# Patient Record
Sex: MALE | Race: WHITE | NOT HISPANIC OR LATINO
[De-identification: names, ages, dates, MRNs, and addresses within clinical notes are randomized per-mention and may not be internally consistent; named-entity substitution may affect disease eponyms.]

---

## 2018-12-10 PROBLEM — Z00.00 ENCOUNTER FOR PREVENTIVE HEALTH EXAMINATION: Status: ACTIVE | Noted: 2018-12-10

## 2018-12-11 ENCOUNTER — APPOINTMENT (OUTPATIENT)
Dept: NEUROSURGERY | Facility: CLINIC | Age: 73
End: 2018-12-11
Payer: MEDICARE

## 2018-12-11 DIAGNOSIS — Z78.9 OTHER SPECIFIED HEALTH STATUS: ICD-10-CM

## 2018-12-11 PROCEDURE — 99205 OFFICE O/P NEW HI 60 MIN: CPT

## 2018-12-11 RX ORDER — SIMVASTATIN 80 MG/1
TABLET, FILM COATED ORAL
Refills: 0 | Status: ACTIVE | COMMUNITY

## 2018-12-11 RX ORDER — CLOPIDOGREL 75 MG/1
TABLET, FILM COATED ORAL
Refills: 0 | Status: ACTIVE | COMMUNITY

## 2018-12-11 RX ORDER — METOPROLOL SUCCINATE 25 MG/1
25 TABLET, EXTENDED RELEASE ORAL
Refills: 0 | Status: ACTIVE | COMMUNITY

## 2018-12-21 ENCOUNTER — OUTPATIENT (OUTPATIENT)
Dept: OUTPATIENT SERVICES | Facility: HOSPITAL | Age: 73
LOS: 1 days | End: 2018-12-21
Payer: MEDICARE

## 2018-12-21 VITALS
WEIGHT: 154.98 LBS | TEMPERATURE: 98 F | OXYGEN SATURATION: 99 % | HEART RATE: 63 BPM | SYSTOLIC BLOOD PRESSURE: 118 MMHG | RESPIRATION RATE: 14 BRPM | DIASTOLIC BLOOD PRESSURE: 71 MMHG | HEIGHT: 71 IN

## 2018-12-21 DIAGNOSIS — Z98.890 OTHER SPECIFIED POSTPROCEDURAL STATES: Chronic | ICD-10-CM

## 2018-12-21 DIAGNOSIS — I67.1 CEREBRAL ANEURYSM, NONRUPTURED: ICD-10-CM

## 2018-12-21 DIAGNOSIS — Z01.818 ENCOUNTER FOR OTHER PREPROCEDURAL EXAMINATION: ICD-10-CM

## 2018-12-21 DIAGNOSIS — Z95.5 PRESENCE OF CORONARY ANGIOPLASTY IMPLANT AND GRAFT: Chronic | ICD-10-CM

## 2018-12-21 LAB
ANION GAP SERPL CALC-SCNC: 12 MMOL/L — SIGNIFICANT CHANGE UP (ref 5–17)
BLD GP AB SCN SERPL QL: NEGATIVE — SIGNIFICANT CHANGE UP
BUN SERPL-MCNC: 14 MG/DL — SIGNIFICANT CHANGE UP (ref 7–23)
CALCIUM SERPL-MCNC: 9.2 MG/DL — SIGNIFICANT CHANGE UP (ref 8.4–10.5)
CHLORIDE SERPL-SCNC: 102 MMOL/L — SIGNIFICANT CHANGE UP (ref 96–108)
CO2 SERPL-SCNC: 27 MMOL/L — SIGNIFICANT CHANGE UP (ref 22–31)
CREAT SERPL-MCNC: 0.62 MG/DL — SIGNIFICANT CHANGE UP (ref 0.5–1.3)
GLUCOSE SERPL-MCNC: 77 MG/DL — SIGNIFICANT CHANGE UP (ref 70–99)
HCT VFR BLD CALC: 44.3 % — SIGNIFICANT CHANGE UP (ref 39–50)
HGB BLD-MCNC: 14.7 G/DL — SIGNIFICANT CHANGE UP (ref 13–17)
MCHC RBC-ENTMCNC: 28.1 PG — SIGNIFICANT CHANGE UP (ref 27–34)
MCHC RBC-ENTMCNC: 33.2 GM/DL — SIGNIFICANT CHANGE UP (ref 32–36)
MCV RBC AUTO: 84.5 FL — SIGNIFICANT CHANGE UP (ref 80–100)
PLATELET # BLD AUTO: 188 K/UL — SIGNIFICANT CHANGE UP (ref 150–400)
POTASSIUM SERPL-MCNC: 4.4 MMOL/L — SIGNIFICANT CHANGE UP (ref 3.5–5.3)
POTASSIUM SERPL-SCNC: 4.4 MMOL/L — SIGNIFICANT CHANGE UP (ref 3.5–5.3)
RBC # BLD: 5.24 M/UL — SIGNIFICANT CHANGE UP (ref 4.2–5.8)
RBC # FLD: 13.7 % — SIGNIFICANT CHANGE UP (ref 10.3–14.5)
RH IG SCN BLD-IMP: POSITIVE — SIGNIFICANT CHANGE UP
SODIUM SERPL-SCNC: 141 MMOL/L — SIGNIFICANT CHANGE UP (ref 135–145)
WBC # BLD: 5.06 K/UL — SIGNIFICANT CHANGE UP (ref 3.8–10.5)
WBC # FLD AUTO: 5.06 K/UL — SIGNIFICANT CHANGE UP (ref 3.8–10.5)

## 2018-12-21 PROCEDURE — 85027 COMPLETE CBC AUTOMATED: CPT

## 2018-12-21 PROCEDURE — 80048 BASIC METABOLIC PNL TOTAL CA: CPT

## 2018-12-21 PROCEDURE — 86901 BLOOD TYPING SEROLOGIC RH(D): CPT

## 2018-12-21 PROCEDURE — 86850 RBC ANTIBODY SCREEN: CPT

## 2018-12-21 PROCEDURE — 86900 BLOOD TYPING SEROLOGIC ABO: CPT

## 2018-12-21 PROCEDURE — G0463: CPT

## 2018-12-21 RX ORDER — METOPROLOL TARTRATE 50 MG
1 TABLET ORAL
Qty: 0 | Refills: 0 | COMMUNITY

## 2018-12-21 NOTE — H&P PST ADULT - HISTORY OF PRESENT ILLNESS
72 yo male. h/o CAD (s/p ENRIQUE x1 in LAD 2006), prostate cancinoma (s/p XRT 2009). approximately 3 weeks ago, c/o temporary memory loss post coitus, work up revealed 2 unruptured cerebral aneurysms: 3mm aneurysm clinoid left ICA and a 4mm aneurysm surpaclinoid right ICA. presents to PST scheduled for cerebral angiogram on 12/31.

## 2018-12-31 ENCOUNTER — APPOINTMENT (OUTPATIENT)
Dept: NEUROSURGERY | Facility: HOSPITAL | Age: 73
End: 2018-12-31
Payer: MEDICARE

## 2018-12-31 ENCOUNTER — OUTPATIENT (OUTPATIENT)
Dept: OUTPATIENT SERVICES | Facility: HOSPITAL | Age: 73
LOS: 1 days | End: 2018-12-31
Payer: MEDICARE

## 2018-12-31 DIAGNOSIS — I67.1 CEREBRAL ANEURYSM, NONRUPTURED: ICD-10-CM

## 2018-12-31 DIAGNOSIS — Z01.818 ENCOUNTER FOR OTHER PREPROCEDURAL EXAMINATION: ICD-10-CM

## 2018-12-31 DIAGNOSIS — Z98.890 OTHER SPECIFIED POSTPROCEDURAL STATES: Chronic | ICD-10-CM

## 2018-12-31 DIAGNOSIS — Z95.5 PRESENCE OF CORONARY ANGIOPLASTY IMPLANT AND GRAFT: Chronic | ICD-10-CM

## 2018-12-31 LAB — PA ADP PRP-ACNC: 89 PRU — LOW (ref 194–417)

## 2018-12-31 PROCEDURE — 36224 PLACE CATH CAROTD ART: CPT | Mod: 50

## 2018-12-31 PROCEDURE — C1894: CPT

## 2018-12-31 PROCEDURE — 36227 PLACE CATH XTRNL CAROTID: CPT

## 2018-12-31 PROCEDURE — 36226 PLACE CATH VERTEBRAL ART: CPT | Mod: 50

## 2018-12-31 PROCEDURE — C1769: CPT

## 2018-12-31 PROCEDURE — 85576 BLOOD PLATELET AGGREGATION: CPT

## 2018-12-31 PROCEDURE — 36227 PLACE CATH XTRNL CAROTID: CPT | Mod: 50

## 2018-12-31 PROCEDURE — C1887: CPT

## 2018-12-31 PROCEDURE — 36226 PLACE CATH VERTEBRAL ART: CPT

## 2018-12-31 PROCEDURE — 76377 3D RENDER W/INTRP POSTPROCES: CPT | Mod: 26

## 2018-12-31 PROCEDURE — 36224 PLACE CATH CAROTD ART: CPT

## 2018-12-31 RX ORDER — SODIUM CHLORIDE 9 MG/ML
1000 INJECTION INTRAMUSCULAR; INTRAVENOUS; SUBCUTANEOUS
Qty: 0 | Refills: 0 | Status: DISCONTINUED | OUTPATIENT
Start: 2018-12-31 | End: 2019-01-15

## 2018-12-31 NOTE — CHART NOTE - NSCHARTNOTEFT_GEN_A_CORE
Interventional Neuro Radiology  Pre-Procedure Note PA-CELI    This is a 73y ____ hand dominant Male      HPI:      Allergies: No Known Allergies  Zithromax (Other)      PAST MEDICAL & SURGICAL HISTORY:  Dyslipidemia  Prostate cancer: s/p XRT 2009  CAD (coronary artery disease)  Palpitation: treated with metoprolol  S/P drug eluting coronary stent placement: LAD 2006  H/O hernia repair: x3  1980, 1983, 2002 with mesh  left inguinal      Social History:     FAMILY HISTORY:      Current Medications:     Complete Blood Count     WBC Count: 5.06 K/uL    Hemoglobin: 14.7 g/dL    Hematocrit: 44.3 %    Platelet Count - Automated: 188 K/uL    Basic Metabolic Panel (12.21.18 @ 11:13)    Sodium, Serum: 141 mmol/L    Potassium, Serum: 4.4 mmol/L    Chloride, Serum: 102 mmol/L    Carbon Dioxide, Serum: 27 mmol/L    Anion Gap, Serum: 12 mmol/L    Blood Urea Nitrogen, Serum: 14 mg/dL    Creatinine, Serum: 0.62 mg/dL    Glucose, Serum: 77 mg/dL    Calcium, Total Serum: 9.2 mg/dL        Blood Bank:     Assessment/Plan:   This is a 73y  year old right  hand dominant Male  presents with   Patient presents to neuro-IR for selective cerebral angiography.   Procedure, goals, risks, benefits and alternatives  were discussed with patient and patient's family.  All questions were answered.  Risks include but are not limited to stroke, vessel injury, hemorrhage, and or right  groin hematoma.  Patient demonstrates understanding  of all risks involved with this procedure and wishes to continue.   Appropriate  content was obtained from patient and consent is in the patient's chart.

## 2018-12-31 NOTE — CHART NOTE - NSCHARTNOTEFT_GEN_A_CORE
Interventional Neuro- Radiology   Procedure Note TASHA    Procedure: Selective Cerebral Angiography   Pre- Procedure Diagnosis:  Post- Procedure Diagnosis:    : Dr Yves Martinez    Physician Assistant: Maria Isabel Delgado PA-C    Nurse:    Anesthesia: (MAC)   (general anesthesia)    Sheath:    I/Os:  Estimated blood loss less than 10cc  IV fluids:     cc     Urine output     cc        Contrast Omnipaque 240      cc           Vitals: BP         HR      Spo2    %       Preliminary Report:    Using a 5 Malay long sheath to the right groin under MAC sedation via   left vertebral artery,  left common carotid artery, left external carotid artery, right vertebral artery,  right common carotid artery, right external carotid artery  a selective cerebral angiography was performed and  demonstrates ________. ( Official note to follow).  Patient tolerated procedure well, hemodynamically stable, no change in neurological status compared to baseline.  Results discussed with neurosurgery, patient and patient's  family.  Groin sheath was removed,  manual compression held to hemostasis  for  21 minutes, no active bleeding, no hematoma,  quick clot and safeguard balloon dressing applied at _____h. Interventional Neuro- Radiology   Procedure Note PA-C    Procedure: Selective Cerebral Angiography   Pre- Procedure Diagnosis: cerebral aneurysm   Post- Procedure Diagnosis: bilateral ICA aneurysms    : Dr Yves Martinez    Physician Assistant: Maria Isabel Delgado PA-C    Nurse:  Radiologic Tech:  Anesthesiologist:     Dr Juanjo Ambrocio  Sheath:    I/Os: EBL less than 10cc  IV fluids:     cc     Urine output     cc        Contrast Omnipaque 240      cc           Vitals: BP         HR      Spo2    %       Preliminary Report:    Using a 5 Nicaraguan long sheath to the right groin under MAC sedation via left vertebral artery, left internal carotid artery, left external carotid artery, right vertebral artery, right internal carotid artery, right external carotid artery a selective cerebral angiography was performed and demonstrated bilateral ICA aneurysms. Official note to follow.   Patient tolerated procedure well, hemodynamically stable, no change in neurological status compared to baseline. Results discussed with patient and patient's family.  Right groin sheath was removed, manual compression held to hemostasis for 20 minutes, no active bleeding, no hematoma, quick clot and safeguard balloon dressing applied at

## 2019-01-03 PROBLEM — C61 MALIGNANT NEOPLASM OF PROSTATE: Chronic | Status: ACTIVE | Noted: 2018-12-21

## 2019-01-16 ENCOUNTER — APPOINTMENT (OUTPATIENT)
Dept: NEUROSURGERY | Facility: CLINIC | Age: 74
End: 2019-01-16
Payer: MEDICARE

## 2019-01-16 PROCEDURE — 99215 OFFICE O/P EST HI 40 MIN: CPT

## 2019-01-18 NOTE — PHYSICAL EXAM
[FreeTextEntry1] : Awake, alert, and oriented x 3. VSS. In no apparent distress.   Short and long term memory intact.  Speech is clear and appropriate.  Affect is normal.  Voice is strong.  Respirations easy and even.  Normal skin color and pigmentation.  The sclera and conjunctiva normal.  Ears, nose, and neck normal in appearance.  EOMI, no nystagmus, facial sensation intact symmetrically, face symmetrical, hearing intact bilaterally, tongue and palate midline, head turning and shoulder shrug symmetric and no tongue deviation with protrusion.  No pronator drift.   No past-pointing, no tremors noted, no dysdiadochokinesia, and finger to nose dysmetria was not present.  Romberg negative.  \par Ambidextrous , more dominant LEFT\par \par Rises from a seated position in a comfortable fashion.\par \par Gait is well coordinated and stable without the use of an assistive device.   Able to perform tandem walk without loss of balance.   Motor strength in the upper extremities 5/5 in the biceps, triceps, and hand .  Motor strength in the lower extremities is 5/5 in the iliopsoas, quadriceps, and hamstrings.  \par \par

## 2019-01-18 NOTE — HISTORY OF PRESENT ILLNESS
[FreeTextEntry1] : 74 yo right handed male presents to the office for a follow up post cerebral angiogram by Dr. Martinez recently and demonstrated right proximal ICA fusiform dilatation of whole vessel diseased and left ICA aneurysm which will be treated 4-5 weeks later. \par On 12/4/18, MRA shows 3 mm aneurysm clinoid left ICA and 4 mm aneurysm supraclinoid right ICA. MRI unremarkable. \par \par PMH of Prostate cancer treated with radiation in 2009, CAD with stents on Plavix and ASA from 2006. \par PSH Hernia surgery\par Non smoker and denies family history of aneurysms.\par He had an EEG by Dr. Hoang in December 2018. \par [id number] 89 on 12/31/18\par All risks and benefits for treatment with Pipeline stent is being discussed by Dr. Martinez, Dr. Vital and pt. \par \par Plan: Treat aneurysm with Pipleline Stent by Dr. Yves Martinez- PT ALREADY ON PLAVIX AND ASA \par \par \par

## 2019-02-28 ENCOUNTER — OUTPATIENT (OUTPATIENT)
Dept: OUTPATIENT SERVICES | Facility: HOSPITAL | Age: 74
LOS: 1 days | End: 2019-02-28
Payer: MEDICARE

## 2019-02-28 VITALS
SYSTOLIC BLOOD PRESSURE: 106 MMHG | WEIGHT: 162.04 LBS | DIASTOLIC BLOOD PRESSURE: 71 MMHG | HEIGHT: 71 IN | HEART RATE: 58 BPM | RESPIRATION RATE: 16 BRPM | TEMPERATURE: 98 F | OXYGEN SATURATION: 96 %

## 2019-02-28 DIAGNOSIS — I67.1 CEREBRAL ANEURYSM, NONRUPTURED: ICD-10-CM

## 2019-02-28 DIAGNOSIS — Z01.818 ENCOUNTER FOR OTHER PREPROCEDURAL EXAMINATION: ICD-10-CM

## 2019-02-28 DIAGNOSIS — Z98.890 OTHER SPECIFIED POSTPROCEDURAL STATES: Chronic | ICD-10-CM

## 2019-02-28 DIAGNOSIS — Z95.5 PRESENCE OF CORONARY ANGIOPLASTY IMPLANT AND GRAFT: Chronic | ICD-10-CM

## 2019-02-28 LAB
ANION GAP SERPL CALC-SCNC: 14 MMOL/L — SIGNIFICANT CHANGE UP (ref 5–17)
BLD GP AB SCN SERPL QL: NEGATIVE — SIGNIFICANT CHANGE UP
BUN SERPL-MCNC: 18 MG/DL — SIGNIFICANT CHANGE UP (ref 7–23)
CALCIUM SERPL-MCNC: 9.7 MG/DL — SIGNIFICANT CHANGE UP (ref 8.4–10.5)
CHLORIDE SERPL-SCNC: 101 MMOL/L — SIGNIFICANT CHANGE UP (ref 96–108)
CO2 SERPL-SCNC: 25 MMOL/L — SIGNIFICANT CHANGE UP (ref 22–31)
CREAT SERPL-MCNC: 0.59 MG/DL — SIGNIFICANT CHANGE UP (ref 0.5–1.3)
GLUCOSE SERPL-MCNC: 126 MG/DL — HIGH (ref 70–99)
HCT VFR BLD CALC: 46.3 % — SIGNIFICANT CHANGE UP (ref 39–50)
HGB BLD-MCNC: 15.1 G/DL — SIGNIFICANT CHANGE UP (ref 13–17)
MCHC RBC-ENTMCNC: 27.9 PG — SIGNIFICANT CHANGE UP (ref 27–34)
MCHC RBC-ENTMCNC: 32.6 GM/DL — SIGNIFICANT CHANGE UP (ref 32–36)
MCV RBC AUTO: 85.6 FL — SIGNIFICANT CHANGE UP (ref 80–100)
PLATELET # BLD AUTO: 205 K/UL — SIGNIFICANT CHANGE UP (ref 150–400)
POTASSIUM SERPL-MCNC: 4 MMOL/L — SIGNIFICANT CHANGE UP (ref 3.5–5.3)
POTASSIUM SERPL-SCNC: 4 MMOL/L — SIGNIFICANT CHANGE UP (ref 3.5–5.3)
RBC # BLD: 5.41 M/UL — SIGNIFICANT CHANGE UP (ref 4.2–5.8)
RBC # FLD: 13.1 % — SIGNIFICANT CHANGE UP (ref 10.3–14.5)
RH IG SCN BLD-IMP: POSITIVE — SIGNIFICANT CHANGE UP
SODIUM SERPL-SCNC: 140 MMOL/L — SIGNIFICANT CHANGE UP (ref 135–145)
WBC # BLD: 5.63 K/UL — SIGNIFICANT CHANGE UP (ref 3.8–10.5)
WBC # FLD AUTO: 5.63 K/UL — SIGNIFICANT CHANGE UP (ref 3.8–10.5)

## 2019-02-28 NOTE — H&P PST ADULT - CARDIOVASCULAR
Regular rate & rhythm, normal S1, S2; no murmurs, gallops or rubs; no S3, S4 negative details… detailed exam

## 2019-02-28 NOTE — H&P PST ADULT - PROBLEM SELECTOR PLAN 1
cerebral angio  pt was instructed by Dr. Martinez's office to continue aspirin and plavix cerebral angio/ embolization   continue aspirin and Plavix  baseline EKG on file 12/2018  preprocedure instructions discussed

## 2019-02-28 NOTE — H&P PST ADULT - PMH
CAD (coronary artery disease)    Dyslipidemia    Palpitation  treated with metoprolol  Prostate cancer  s/p XRT 2009 CAD (coronary artery disease)  denies MI  Dyslipidemia    Prostate cancer  s/p XRT 2009

## 2019-02-28 NOTE — H&P PST ADULT - NEUROLOGICAL COMMENTS
see HPI, currently denies memory loss, focal deficits episode of memory loss 11/2018 found to have aneurysms on imaging

## 2019-02-28 NOTE — H&P PST ADULT - NEUROLOGICAL DETAILS
alert and oriented x 3/responds to verbal commands responds to verbal commands/no spontaneous movement/alert and oriented x 3

## 2019-02-28 NOTE — H&P PST ADULT - PSH
H/O hernia repair  x3  1980, 1983, 2002 with mesh  left inguinal  S/P drug eluting coronary stent placement  LAD 2006

## 2019-02-28 NOTE — H&P PST ADULT - HISTORY OF PRESENT ILLNESS
72 yo male. h/o CAD (s/p ENRIQUE x1 in LAD 2006), prostate cancinoma (s/p XRT 2009). approximately 3 weeks ago, c/o temporary memory loss post coitus, work up revealed 2 unruptured cerebral aneurysms: 3mm aneurysm clinoid left ICA and a 4mm aneurysm surpaclinoid right ICA. presents to PST scheduled for cerebral angiogram on 12/31. 74 yo male with PMH of CAD (s/p ENRIQUE x1 in LAD 2006), prostate carcinoma (s/p XRT 2009) s/p cerebral angiogram 12/2018 found to have bilateral supraclinoid internal carotid artery aneurysms planned for cerebral angiogram with embolization on 3/11/19.

## 2019-03-01 PROBLEM — I25.10 ATHEROSCLEROTIC HEART DISEASE OF NATIVE CORONARY ARTERY WITHOUT ANGINA PECTORIS: Chronic | Status: ACTIVE | Noted: 2018-12-21

## 2019-03-01 PROBLEM — R00.2 PALPITATIONS: Chronic | Status: INACTIVE | Noted: 2018-12-21 | Resolved: 2019-02-28

## 2019-03-11 ENCOUNTER — APPOINTMENT (OUTPATIENT)
Dept: NEUROSURGERY | Facility: HOSPITAL | Age: 74
End: 2019-03-11

## 2019-03-11 ENCOUNTER — INPATIENT (INPATIENT)
Facility: HOSPITAL | Age: 74
LOS: 0 days | Discharge: HOME | DRG: 27 | End: 2019-03-12
Attending: NEUROLOGICAL SURGERY | Admitting: NEUROLOGICAL SURGERY
Payer: MEDICARE

## 2019-03-11 ENCOUNTER — APPOINTMENT (OUTPATIENT)
Dept: NEUROSURGERY | Facility: HOSPITAL | Age: 74
End: 2019-03-11
Payer: MEDICARE

## 2019-03-11 VITALS
HEART RATE: 60 BPM | SYSTOLIC BLOOD PRESSURE: 104 MMHG | DIASTOLIC BLOOD PRESSURE: 60 MMHG | OXYGEN SATURATION: 96 % | TEMPERATURE: 97 F | RESPIRATION RATE: 16 BRPM

## 2019-03-11 DIAGNOSIS — Z95.5 PRESENCE OF CORONARY ANGIOPLASTY IMPLANT AND GRAFT: Chronic | ICD-10-CM

## 2019-03-11 DIAGNOSIS — I67.1 CEREBRAL ANEURYSM, NONRUPTURED: ICD-10-CM

## 2019-03-11 DIAGNOSIS — Z98.890 OTHER SPECIFIED POSTPROCEDURAL STATES: Chronic | ICD-10-CM

## 2019-03-11 LAB
ALBUMIN SERPL ELPH-MCNC: 3.4 G/DL — SIGNIFICANT CHANGE UP (ref 3.3–5)
ALP SERPL-CCNC: 52 U/L — SIGNIFICANT CHANGE UP (ref 40–120)
ALT FLD-CCNC: 21 U/L — SIGNIFICANT CHANGE UP (ref 10–45)
ANION GAP SERPL CALC-SCNC: 12 MMOL/L — SIGNIFICANT CHANGE UP (ref 5–17)
AST SERPL-CCNC: 18 U/L — SIGNIFICANT CHANGE UP (ref 10–40)
BILIRUB SERPL-MCNC: 0.4 MG/DL — SIGNIFICANT CHANGE UP (ref 0.2–1.2)
BUN SERPL-MCNC: 10 MG/DL — SIGNIFICANT CHANGE UP (ref 7–23)
CALCIUM SERPL-MCNC: 8.4 MG/DL — SIGNIFICANT CHANGE UP (ref 8.4–10.5)
CHLORIDE SERPL-SCNC: 106 MMOL/L — SIGNIFICANT CHANGE UP (ref 96–108)
CO2 SERPL-SCNC: 23 MMOL/L — SIGNIFICANT CHANGE UP (ref 22–31)
CREAT SERPL-MCNC: 0.49 MG/DL — LOW (ref 0.5–1.3)
GLUCOSE SERPL-MCNC: 116 MG/DL — HIGH (ref 70–99)
HCT VFR BLD CALC: 38.9 % — LOW (ref 39–50)
HGB BLD-MCNC: 13.5 G/DL — SIGNIFICANT CHANGE UP (ref 13–17)
MAGNESIUM SERPL-MCNC: 2.1 MG/DL — SIGNIFICANT CHANGE UP (ref 1.6–2.6)
MCHC RBC-ENTMCNC: 29.1 PG — SIGNIFICANT CHANGE UP (ref 27–34)
MCHC RBC-ENTMCNC: 34.6 GM/DL — SIGNIFICANT CHANGE UP (ref 32–36)
MCV RBC AUTO: 84 FL — SIGNIFICANT CHANGE UP (ref 80–100)
PA ADP PRP-ACNC: 70 PRU — LOW (ref 194–417)
PHOSPHATE SERPL-MCNC: 3.1 MG/DL — SIGNIFICANT CHANGE UP (ref 2.5–4.5)
PLATELET # BLD AUTO: 150 K/UL — SIGNIFICANT CHANGE UP (ref 150–400)
POTASSIUM SERPL-MCNC: 3.7 MMOL/L — SIGNIFICANT CHANGE UP (ref 3.5–5.3)
POTASSIUM SERPL-SCNC: 3.7 MMOL/L — SIGNIFICANT CHANGE UP (ref 3.5–5.3)
PROT SERPL-MCNC: 5.7 G/DL — LOW (ref 6–8.3)
RBC # BLD: 4.63 M/UL — SIGNIFICANT CHANGE UP (ref 4.2–5.8)
RBC # FLD: 12.7 % — SIGNIFICANT CHANGE UP (ref 10.3–14.5)
SODIUM SERPL-SCNC: 141 MMOL/L — SIGNIFICANT CHANGE UP (ref 135–145)
WBC # BLD: 5.9 K/UL — SIGNIFICANT CHANGE UP (ref 3.8–10.5)
WBC # FLD AUTO: 5.9 K/UL — SIGNIFICANT CHANGE UP (ref 3.8–10.5)

## 2019-03-11 PROCEDURE — 36224 PLACE CATH CAROTD ART: CPT | Mod: RT

## 2019-03-11 PROCEDURE — 75894 X-RAYS TRANSCATH THERAPY: CPT | Mod: 26

## 2019-03-11 PROCEDURE — 86900 BLOOD TYPING SEROLOGIC ABO: CPT

## 2019-03-11 PROCEDURE — G0463: CPT

## 2019-03-11 PROCEDURE — 36228 PLACE CATH INTRACRANIAL ART: CPT | Mod: RT

## 2019-03-11 PROCEDURE — 85027 COMPLETE CBC AUTOMATED: CPT

## 2019-03-11 PROCEDURE — 86850 RBC ANTIBODY SCREEN: CPT

## 2019-03-11 PROCEDURE — 80048 BASIC METABOLIC PNL TOTAL CA: CPT

## 2019-03-11 PROCEDURE — 61624 TCAT PERM OCCLS/EMBOLJ CNS: CPT

## 2019-03-11 PROCEDURE — 75898 FOLLOW-UP ANGIOGRAPHY: CPT | Mod: 26

## 2019-03-11 PROCEDURE — 70496 CT ANGIOGRAPHY HEAD: CPT | Mod: 26

## 2019-03-11 PROCEDURE — 99291 CRITICAL CARE FIRST HOUR: CPT

## 2019-03-11 PROCEDURE — 86901 BLOOD TYPING SEROLOGIC RH(D): CPT

## 2019-03-11 PROCEDURE — 99292 CRITICAL CARE ADDL 30 MIN: CPT

## 2019-03-11 PROCEDURE — 85576 BLOOD PLATELET AGGREGATION: CPT

## 2019-03-11 RX ORDER — SENNA PLUS 8.6 MG/1
2 TABLET ORAL AT BEDTIME
Qty: 0 | Refills: 0 | Status: DISCONTINUED | OUTPATIENT
Start: 2019-03-11 | End: 2019-03-12

## 2019-03-11 RX ORDER — SODIUM CHLORIDE 9 MG/ML
1000 INJECTION INTRAMUSCULAR; INTRAVENOUS; SUBCUTANEOUS
Qty: 0 | Refills: 0 | Status: DISCONTINUED | OUTPATIENT
Start: 2019-03-11 | End: 2019-03-12

## 2019-03-11 RX ORDER — ASPIRIN/CALCIUM CARB/MAGNESIUM 324 MG
325 TABLET ORAL DAILY
Qty: 0 | Refills: 0 | Status: DISCONTINUED | OUTPATIENT
Start: 2019-03-11 | End: 2019-03-12

## 2019-03-11 RX ORDER — CLOPIDOGREL BISULFATE 75 MG/1
75 TABLET, FILM COATED ORAL DAILY
Qty: 0 | Refills: 0 | Status: DISCONTINUED | OUTPATIENT
Start: 2019-03-11 | End: 2019-03-12

## 2019-03-11 RX ORDER — POTASSIUM CHLORIDE 20 MEQ
40 PACKET (EA) ORAL ONCE
Qty: 0 | Refills: 0 | Status: COMPLETED | OUTPATIENT
Start: 2019-03-11 | End: 2019-03-11

## 2019-03-11 RX ORDER — METOPROLOL TARTRATE 50 MG
25 TABLET ORAL DAILY
Qty: 0 | Refills: 0 | Status: DISCONTINUED | OUTPATIENT
Start: 2019-03-11 | End: 2019-03-12

## 2019-03-11 RX ORDER — SIMVASTATIN 20 MG/1
20 TABLET, FILM COATED ORAL AT BEDTIME
Qty: 0 | Refills: 0 | Status: DISCONTINUED | OUTPATIENT
Start: 2019-03-11 | End: 2019-03-12

## 2019-03-11 RX ORDER — DOCUSATE SODIUM 100 MG
100 CAPSULE ORAL THREE TIMES A DAY
Qty: 0 | Refills: 0 | Status: DISCONTINUED | OUTPATIENT
Start: 2019-03-11 | End: 2019-03-12

## 2019-03-11 RX ADMIN — Medication 40 MILLIEQUIVALENT(S): at 20:16

## 2019-03-11 RX ADMIN — SIMVASTATIN 20 MILLIGRAM(S): 20 TABLET, FILM COATED ORAL at 21:59

## 2019-03-11 RX ADMIN — SENNA PLUS 2 TABLET(S): 8.6 TABLET ORAL at 21:15

## 2019-03-11 RX ADMIN — Medication 100 MILLIGRAM(S): at 21:15

## 2019-03-11 NOTE — PROGRESS NOTE ADULT - SUBJECTIVE AND OBJECTIVE BOX
72 yo male with PMH of CAD (s/p ENRIQUE x1 in LAD 2006), prostate carcinoma (s/p XRT 2009) s/p cerebral angiogram 12/2018 found to have bilateral supraclinoid internal carotid artery aneurysms. Underwent planned cerebral angiogram with embolization on 3/11/19.     On admission, the patient was:  GCS: 15.    VITALS:  T(C): , Max: 36 (03-11-19 @ 14:52)  HR:  (52 - 69)  BP:  (104/60 - 112/65)  ABP:  (111/45 - 128/57)  RR:  (14 - 16)  SpO2:  (96% - 99%)      03-11-19 @ 07:01  -  03-11-19 @ 16:07  --------------------------------------------------------  IN: 140 mL / OUT: 300 mL / NET: -160 mL      LABS: recent labs reviewed.    IMAGING:   Recent imaging studies were reviewed.    MEDICATIONS:  aspirin 325 milliGRAM(s) Oral daily  clopidogrel Tablet 75 milliGRAM(s) Oral daily  metoprolol succinate ER 25 milliGRAM(s) Oral daily  multivitamin 1 Tablet(s) Oral daily  simvastatin 20 milliGRAM(s) Oral at bedtime  sodium chloride 0.9%. 1000 milliLiter(s) IV Continuous <Continuous>    EXAMINATION:  General:  calm  HEENT:  EOMI, PERRLA, face symmetric  Neuro:  awake, alert, oriented x 4, follows commands, moves all extremities, no drift.  Cards:  RRR  Respiratory:  no respiratory distress  Adomen:  soft  Extremities:  no edema  Skin:  warm/dry

## 2019-03-11 NOTE — CHART NOTE - NSCHARTNOTEFT_GEN_A_CORE
Interventional Neuro- Radiology   Procedure Note TASHA    Procedure: Selective Cerebral Angiography   Pre- Procedure Diagnosis:  Post- Procedure Diagnosis:    : Dr Yves Martinez    Physician Assistant: Maria Isabel Delgado PA-C    RN:    Anesthesia: (MAC)   (general anesthesia)    Sheath:    I/Os:  Estimated blood loss less than 10cc  IV fluids:     cc     Urine output     cc        Contrast Omnipaque 240      cc         Antibiotics:    Vitals: BP         HR      Spo2       Preliminary Report:    Using a  sheath to the right groin under MAC sedation via   left vertebral artery,  left common carotid artery, left external carotid artery, right vertebral artery,  right common carotid artery, right external carotid artery  a selective cerebral angiography was performed and  demonstrates ________. ( Official note to follow).  Patient tolerated procedure well, hemodynamically stable, no change in neurological status compared to baseline. Results discussed with neuro ICU, patient and patient's  family.  Right groin sheath was removed, manual compression held to hemostasis for 21 minutes, no active bleeding, no hematoma, quick clot and safeguard balloon dressing applied at _____h. Interventional Neuro- Radiology   Procedure Note PA-CELI    Procedure: Selective Cerebral Angiography   Pre- Procedure Diagnosis: bilateral ICA aneurysms  Post- Procedure Diagnosis:    : Dr Yves Martinez  Fellow:     Dr Radha Arias   Resident: Dr Magda Ewing  Physician Assistant: Maria Isabel Delgado PA-C    Nurse:  Radiologic tech:  Anesthesiologist:  Sheath:    I/Os:EBL less than 10cc  IV fluids:     cc     Urine output     cc        Contrast Omnipaque 240      cc         Antibiotics:    Vitals: BP         HR      Spo2       Preliminary Report:  Using a  sheath to the right groin under MAC sedation via   left vertebral artery,  left common carotid artery, left external carotid artery, right vertebral artery,  right common carotid artery, right external carotid artery  a selective cerebral angiography was performed and  demonstrates ________. ( Official note to follow).  Patient tolerated procedure well, hemodynamically stable, no change in neurological status compared to baseline. Results discussed with neuro ICU, patient and patient's  family.  Right groin sheath was removed, manual compression held to hemostasis for 21 minutes, no active bleeding, no hematoma, quick clot and safeguard balloon dressing applied at _____h. Interventional Neuro- Radiology   Procedure Note PA-C    Procedure: Selective Cerebral Angiography and endovascular treatment of right ICA aneurysm with three pipeline stents    Pre- Procedure Diagnosis: bilateral ICA aneurysms  Post- Procedure Diagnosis: complete occlusion of right ICA aneurysm     : Dr Yves Martinez  Fellow:     Dr Radha Arias   Resident: Dr Magda Ewing  Physician Assistant: Maria Isabel Delgado PA-C    Nurse:                   Rachael Jeffries RN  Radiologic tech:    Ha Dickinson LRT      Luke Jernigan LRT  Anesthesiologist:   Dr Juanjo Tovar  Sheath:                   4 Luxembourgish fubuki 80cm                  PRU                         70  I/Os: EBL less than 10cc  IV fluids: 600cc Urine output 300cc  Contrast Omnipaque 240 101cc         Antibiotics: Ancef 2 grams     Heparin 4,500 units IV push    repeat     Nicardipine 3mg right ICA at 1317    Vitals: /68        HR 78     Spo2 100%       Preliminary Report:  Using a  4 Tristanian Fubuki sheath to the right groin under general anesthesia via right internal carotid artery a selective cerebral angiography was performed and again demonstrated a right ICA aneurysm. Patient underwent successful right ICA aneurysm occlusion with three pipeline stents. Official note to follow.  Patient tolerated procedure well, hemodynamically stable, no change in neurological status compared to baseline. Results discussed with neuro ICU, patient and patient's family. Right groin sheath was removed, star close device and manual compression held to hemostasis for 21 minutes, no active bleeding, no hematoma, quick clot and safeguard balloon dressing applied at 1430 hours. MRI/MRA NOVA pending. Interventional Neuro- Radiology   Procedure Note PA-C    Procedure: Selective Cerebral Angiography and endovascular treatment of right ICA aneurysm with three pipeline stents    Pre- Procedure Diagnosis: bilateral ICA aneurysms  Post- Procedure Diagnosis: complete occlusion of right ICA aneurysm     : Dr Yves Martinez  Fellow:     Dr Radha Arias   Resident: Dr Magda Ewing  Physician Assistant: Maria Isabel Delgado PA-C    Nurse:                   Rachael Jeffries RN  Radiologic tech:    Ha Dickinson LRT      Luke Jernigan LRT  Anesthesiologist:   Dr Juanjo Tovar  Sheath:                 4 Honduran fubuki 80cm                  PRU                      70  I/Os: EBL less than 10cc  IV fluids: 600cc Urine output 300cc  Contrast Omnipaque 240 101cc         Antibiotics: Ancef 2 grams     Heparin 4,500 units IV push    repeat     Nicardipine 3mg right ICA at 1317    Vitals: /68        HR 78     Spo2 100%       Preliminary Report:  Using a  4 Pashto Fubuki sheath to the right groin under general anesthesia via right internal carotid artery a selective cerebral angiography was performed and again demonstrated a right ICA aneurysm. Patient underwent successful right ICA aneurysm occlusion with three pipeline stents. Official note to follow.  Patient tolerated procedure well, hemodynamically stable, no change in neurological status compared to baseline. Results discussed with neuro ICU, patient and patient's family. Right groin sheath was removed, star close device and manual compression held to hemostasis for 21 minutes, no active bleeding, no hematoma, quick clot and safeguard balloon dressing applied at 1430 hours. MRI/MRA NOVA pending. Interventional Neuro- Radiology   Procedure Note PA-C    Procedure: Selective Cerebral Angiography and endovascular treatment of right ICA aneurysm with three pipeline stents    Pre- Procedure Diagnosis: bilateral ICA aneurysms  Post- Procedure Diagnosis: complete occlusion of right ICA aneurysm     : Dr Yves Martinez  Fellow:     Dr Radha Arias   Resident: Dr Magda Ewing  Physician Assistant: Maria Isabel Delgado PA-C    Nurse:                   Rachael Jeffrise RN  Radiologic tech:    Ha Dickinson LRT           Luke Jernigan LRT  Anesthesiologist:   Dr Juanjo Tovar  Sheath:                 4 Azeri fubuki 80cm                  PRU                      70  I/Os: EBL less than 10cc  IV fluids: 600cc Urine output 300cc  Contrast Omnipaque 240 101cc         Antibiotics: Ancef 2 grams     Heparin 4,500 units IV push    repeat     Nicardipine 3mg right ICA at 1317    Vitals: /68        HR 78     Spo2 100%       Preliminary Report:  Using a  4 Colombian Fubuki sheath to the right groin under general anesthesia via right internal carotid artery a selective cerebral angiography was performed and again demonstrated a right ICA aneurysm. Patient underwent successful right ICA aneurysm occlusion with three pipeline stents. Official note to follow.  Patient tolerated procedure well, hemodynamically stable, no change in neurological status compared to baseline. Results discussed with neuro ICU, patient and patient's family. Right groin sheath was removed, star close device and manual compression held to hemostasis for 21 minutes, no active bleeding, no hematoma, quick clot and safeguard balloon dressing applied at 1430 hours. MRI/MRA NOVA pending. Interventional Neuro- Radiology   Procedure Note PA-C    Procedure: Selective Cerebral Angiography and endovascular treatment of right ICA aneurysm with three pipeline stents    Pre- Procedure Diagnosis: bilateral ICA aneurysms  Post- Procedure Diagnosis: complete occlusion of right ICA aneurysm     : Dr Yves Martinez  Fellow:     Dr Radha Arias   Resident: Dr Magda Ewing  Physician Assistant: Maria Isabel Delgado PA-C    Nurse:                   Rachael Jeffries RN  Radiologic tech:    Ha Dickinson LRT           Luke Jernigan LRT  Anesthesiologist:   Dr Juanjo Tovar  Sheath:                 4 Armenian fubuki 80cm                  PRU                      70  I/Os: EBL less than 10cc  IV fluids: 600cc Urine output 300cc  Contrast Omnipaque 240 101cc         Antibiotics: Ancef 2 grams     Heparin 4,500 units IV push    repeat     Nicardipine 3mg right ICA at 1317    Vitals: /68        HR 78     Spo2 100%       Preliminary Report:  Using a  4 Vietnamese Fubuki sheath to the right groin under general anesthesia via right internal carotid artery a selective cerebral angiography was performed and again demonstrated a right ICA aneurysm. Patient underwent successful right ICA aneurysm occlusion with three pipeline stents. Official note to follow.  Patient tolerated procedure well, hemodynamically stable, no change in neurological status compared to baseline. Results discussed with neuro ICU, patient and patient's family. Right groin sheath was removed, star close device and manual compression held to hemostasis for 21 minutes, no active bleeding, no hematoma, quick clot and safeguard balloon dressing applied at 1430 hours. MRI/MRA NOVA pending.

## 2019-03-11 NOTE — PROGRESS NOTE ADULT - SUBJECTIVE AND OBJECTIVE BOX
status post elective angio with Pipeline stent x 3 to right supraclinoid artery aneurysm status post elective angio with Pipeline stent x 3 to right supraclinoid artery aneurysm    EXAMINATION:  General:  calm  HEENT:  EOMI, PERRLA, face symmetric  Neuro:  awake, alert, oriented fully, follows commands, full strength  Cards:  RRR  Respiratory:  no respiratory distress  Abdomen:  soft  Extremities:  no edema, no groin hematoma, + distal pulses  Skin:  warm/dry

## 2019-03-11 NOTE — CHART NOTE - NSCHARTNOTEFT_GEN_A_CORE
Interventional Neuro Radiology  Pre-Procedure Note PA-C      This is a 72 yo male with PMH of CAD (s/p ENRIQUE x1 in LAD 2006), prostate carcinoma (s/p XRT 2009) s/p cerebral angiogram 12/2018 found to have bilateral supraclinoid internal carotid artery aneurysms planned for cerebral angiogram with embolization on 3/11/19. (28 Feb 2019 16:06)      Allergies: No Known Allergies, Zithromax (Other)  PMHX: dyslipidemia, Prostate cancer: s/p XRT 2009, coronary artery disease   PSHX: drug eluting coronary stent placement: LAD 2006,  hernia repair: x3  1980, 1983, 2002 with mesh  left inguinal  Social History:   FAMILY HISTORY:  Current Medications: ASA Plavix     Complete Blood Count (02.28.19 @ 20:54)    WBC Count: 5.63 K/uL    Hemoglobin: 15.1 g/dL    Hematocrit: 46.3 %    Platelet Count - Automated: 205 K/uL    Basic Metabolic Panel (02.28.19 @ 16:39)    Sodium, Serum: 140 mmol/L    Potassium, Serum: 4.0 mmol/L    Chloride, Serum: 101 mmol/L    Carbon Dioxide, Serum: 25 mmol/L    Anion Gap, Serum: 14 mmol/L    Blood Urea Nitrogen, Serum: 18 mg/dL    Creatinine, Serum: 0.59 mg/dL    Glucose, Serum: 126 mg/dL    Blood Bank: A (02.28.19 @ 16:45)      Assessment/Plan:   This is a 73 year old right hand dominant male with   Procedure, goals, risks, benefits and alternatives were discussed with patient and patient's family. All questions were answered. Risks include but are not limited to stroke, vessel injury, hemorrhage, and or right groin hematoma. Patient demonstrates understanding of all risks involved with this procedure and wishes to continue. Appropriate content was obtained from patient and consent is in the patient's chart. Interventional Neuro Radiology  Pre-Procedure Note PA-CELI      This is a 72 yo male with PMH of CAD (s/p ENRIQUE x1 in LAD 2006), prostate carcinoma (s/p XRT 2009) s/p cerebral angiogram 12/2018 found to have bilateral supraclinoid internal carotid artery aneurysms planned for cerebral angiogram with embolization on 3/11/19. (28 Feb 2019 16:06)    Allergies: No Known Allergies, Zithromax (Other)  PMHX: dyslipidemia, Prostate cancer: s/p XRT 2009, coronary artery disease   PSHX: drug eluting coronary stent placement: LAD 2006,  hernia repair: x3  1980, 1983, 2002 with mesh  left inguinal, dx angiography 12-  Social History:   FAMILY HISTORY:  Current Medications: ASA Plavix     Complete Blood Count (02.28.19 @ 20:54)    WBC Count: 5.63 K/uL    Hemoglobin: 15.1 g/dL    Hematocrit: 46.3 %    Platelet Count - Automated: 205 K/uL    Basic Metabolic Panel (02.28.19 @ 16:39)    Sodium, Serum: 140 mmol/L    Potassium, Serum: 4.0 mmol/L    Chloride, Serum: 101 mmol/L    Carbon Dioxide, Serum: 25 mmol/L    Anion Gap, Serum: 14 mmol/L    Blood Urea Nitrogen, Serum: 18 mg/dL    Creatinine, Serum: 0.59 mg/dL    Glucose, Serum: 126 mg/dL    Blood Bank: A (02.28.19 @ 16:45)      Assessment/Plan:   This is a 73 year old right hand dominant male with   Procedure, goals, risks, benefits and alternatives were discussed with patient and patient's family. All questions were answered. Risks include but are not limited to stroke, vessel injury, hemorrhage, and or right groin hematoma. Patient demonstrates understanding of all risks involved with this procedure and wishes to continue. Appropriate content was obtained from patient and consent is in the patient's chart. Interventional Neuro Radiology  Pre-Procedure Note PA-C      This is a 73 year old left hand dominant male with PMH of CAD (s/p ENRIQUE x1 in LAD 2006), prostate carcinoma (s/p XRT 2009) s/p cerebral angiogram 12/2018 found to have bilateral supraclinoid internal carotid artery aneurysms Patient presents for cerebral angiogram with embolization of right ICA aneurysm.     Allergies: No Known Allergies, Zithromax (Other)  PMHX: dyslipidemia, Prostate cancer: s/p XRT 2009, coronary artery disease   PSHX: drug eluting coronary stent placement: LAD 2006,  hernia repair: x3  1980, 1983, 2002 with mesh  left inguinal, dx angiography 12-  Social History: non-smoker,     FAMILY HISTORY: non-contributory   Current Medications: ASA Plavix     Complete Blood Count     WBC Count: 5.63 K/uL    Hemoglobin: 15.1 g/dL    Hematocrit: 46.3 %    Platelet Count - Automated: 205 K/uL    Basic Metabolic Panel     Sodium, Serum: 140 mmol/L    Potassium, Serum: 4.0 mmol/L    Chloride, Serum: 101 mmol/L    Carbon Dioxide, Serum: 25 mmol/L    Anion Gap, Serum: 14 mmol/L    Blood Urea Nitrogen, Serum: 18 mg/dL    Creatinine, Serum: 0.59 mg/dL    Glucose, Serum: 126 mg/dL    Blood Bank: A available     Assessment/Plan:   This is a 73 year old left hand dominant male with a right ICA aneurysm who presents to Neuro IR for a selective cerebral angiography and pipeline placement for treatment of right ICA aneurysm. Procedure, goals, risks, benefits and alternatives were discussed with patient and patient's wife. All questions were answered. Risks include but are not limited to stroke, vessel injury, hemorrhage, and or right groin hematoma. Patient demonstrates understanding of all risks involved with this procedure and wishes to continue. Appropriate content was obtained from patient and consent is in the patient's chart.

## 2019-03-12 ENCOUNTER — TRANSCRIPTION ENCOUNTER (OUTPATIENT)
Age: 74
End: 2019-03-12

## 2019-03-12 VITALS
TEMPERATURE: 98 F | SYSTOLIC BLOOD PRESSURE: 126 MMHG | RESPIRATION RATE: 20 BRPM | HEART RATE: 52 BPM | DIASTOLIC BLOOD PRESSURE: 69 MMHG

## 2019-03-12 LAB — PA ADP PRP-ACNC: 111 PRU — LOW (ref 194–417)

## 2019-03-12 PROCEDURE — 70551 MRI BRAIN STEM W/O DYE: CPT

## 2019-03-12 PROCEDURE — 80053 COMPREHEN METABOLIC PANEL: CPT

## 2019-03-12 PROCEDURE — 70544 MR ANGIOGRAPHY HEAD W/O DYE: CPT

## 2019-03-12 PROCEDURE — 75894 X-RAYS TRANSCATH THERAPY: CPT

## 2019-03-12 PROCEDURE — C1887: CPT

## 2019-03-12 PROCEDURE — 76380 CAT SCAN FOLLOW-UP STUDY: CPT

## 2019-03-12 PROCEDURE — 83735 ASSAY OF MAGNESIUM: CPT

## 2019-03-12 PROCEDURE — C1760: CPT

## 2019-03-12 PROCEDURE — 99232 SBSQ HOSP IP/OBS MODERATE 35: CPT

## 2019-03-12 PROCEDURE — C1894: CPT

## 2019-03-12 PROCEDURE — 61624 TCAT PERM OCCLS/EMBOLJ CNS: CPT

## 2019-03-12 PROCEDURE — 70544 MR ANGIOGRAPHY HEAD W/O DYE: CPT | Mod: 26,59

## 2019-03-12 PROCEDURE — 75898 FOLLOW-UP ANGIOGRAPHY: CPT

## 2019-03-12 PROCEDURE — 84100 ASSAY OF PHOSPHORUS: CPT

## 2019-03-12 PROCEDURE — 85027 COMPLETE CBC AUTOMATED: CPT

## 2019-03-12 PROCEDURE — 99238 HOSP IP/OBS DSCHRG MGMT 30/<: CPT

## 2019-03-12 PROCEDURE — 85576 BLOOD PLATELET AGGREGATION: CPT

## 2019-03-12 PROCEDURE — C1889: CPT

## 2019-03-12 PROCEDURE — 36228 PLACE CATH INTRACRANIAL ART: CPT | Mod: 59

## 2019-03-12 PROCEDURE — 70551 MRI BRAIN STEM W/O DYE: CPT | Mod: 26

## 2019-03-12 PROCEDURE — C1769: CPT

## 2019-03-12 PROCEDURE — 36224 PLACE CATH CAROTD ART: CPT

## 2019-03-12 RX ORDER — DOCUSATE SODIUM 100 MG
1 CAPSULE ORAL
Qty: 0 | Refills: 0 | DISCHARGE
Start: 2019-03-12

## 2019-03-12 RX ORDER — SENNA PLUS 8.6 MG/1
2 TABLET ORAL
Qty: 0 | Refills: 0 | DISCHARGE
Start: 2019-03-12

## 2019-03-12 RX ORDER — CLOPIDOGREL BISULFATE 75 MG/1
1 TABLET, FILM COATED ORAL
Qty: 30 | Refills: 0
Start: 2019-03-12

## 2019-03-12 RX ORDER — CLOPIDOGREL BISULFATE 75 MG/1
1 TABLET, FILM COATED ORAL
Qty: 30 | Refills: 0 | OUTPATIENT
Start: 2019-03-12

## 2019-03-12 RX ORDER — ACETAMINOPHEN 500 MG
2 TABLET ORAL
Qty: 0 | Refills: 0 | DISCHARGE
Start: 2019-03-12

## 2019-03-12 RX ORDER — ASPIRIN/CALCIUM CARB/MAGNESIUM 324 MG
1 TABLET ORAL
Qty: 0 | Refills: 0 | COMMUNITY

## 2019-03-12 RX ORDER — ASPIRIN/CALCIUM CARB/MAGNESIUM 324 MG
1 TABLET ORAL
Qty: 30 | Refills: 0
Start: 2019-03-12

## 2019-03-12 RX ORDER — CLOPIDOGREL BISULFATE 75 MG/1
1 TABLET, FILM COATED ORAL
Qty: 0 | Refills: 0 | COMMUNITY

## 2019-03-12 RX ORDER — ACETAMINOPHEN 500 MG
650 TABLET ORAL EVERY 6 HOURS
Qty: 0 | Refills: 0 | Status: DISCONTINUED | OUTPATIENT
Start: 2019-03-12 | End: 2019-03-12

## 2019-03-12 RX ADMIN — SODIUM CHLORIDE 70 MILLILITER(S): 9 INJECTION INTRAMUSCULAR; INTRAVENOUS; SUBCUTANEOUS at 12:07

## 2019-03-12 RX ADMIN — Medication 325 MILLIGRAM(S): at 12:05

## 2019-03-12 RX ADMIN — Medication 1 TABLET(S): at 12:07

## 2019-03-12 RX ADMIN — Medication 25 MILLIGRAM(S): at 05:55

## 2019-03-12 RX ADMIN — CLOPIDOGREL BISULFATE 75 MILLIGRAM(S): 75 TABLET, FILM COATED ORAL at 12:05

## 2019-03-12 RX ADMIN — Medication 100 MILLIGRAM(S): at 05:55

## 2019-03-12 NOTE — PROGRESS NOTE ADULT - SUBJECTIVE AND OBJECTIVE BOX
72 yo male with PMH of CAD (s/p ENRIQUE x1 in LAD 2006), prostate carcinoma (s/p XRT 2009) s/p cerebral angiogram 12/2018 found to have bilateral supraclinoid internal carotid artery aneurysms. Underwent planned cerebral angiogram with embolization on 3/11/19.     On admission, the patient was:  GCS: 15.    stable neurologically overnight    VITALS: reviewed  LABS: recent labs reviewed.  IMAGING: reviewed  Recent imaging studies were reviewed.  MEDICATIONS: reviewed    EXAMINATION:  General:  calm  HEENT:  EOMI, PERRLA, face symmetric  Neuro:  awake, alert, oriented x 4, follows commands, moves all extremities, no drift.  Cards:  RRR  Respiratory:  no respiratory distress  Adomen:  soft  Extremities:  no edema  Skin:  warm/dry

## 2019-03-12 NOTE — DISCHARGE NOTE PROVIDER - NSDCACTIVITY_GEN_ALL_CORE
Showering allowed/Walking - Indoors allowed/Do not drive or operate machinery/Do not make important decisions/No heavy lifting/straining

## 2019-03-12 NOTE — DISCHARGE NOTE PROVIDER - CARE PROVIDER_API CALL
Yves Martinez)  Neurological Surgery  [address] [place] [address]  Phone: [phone]  Fax: [phone]  Follow Up Time:

## 2019-03-12 NOTE — DISCHARGE NOTE PROVIDER - NSDCCPCAREPLAN_GEN_ALL_CORE_FT
PRINCIPAL DISCHARGE DIAGNOSIS  Problem: Status post cerebral aneurysm repair  Assessment and Plan of Treatment:

## 2019-03-12 NOTE — CHART NOTE - NSCHARTNOTEFT_GEN_A_CORE
CAPRINI SCORE [CLOT] Score on Admission for     AGE RELATED RISK FACTORS                                                       MOBILITY RELATED FACTORS  [ ] Age 41-60 years                                            (1 Point)                  [ ] Bed rest                                                        (1 Point)  [x] Age: 61-74 years                                           (2 Points)                 [ ] Plaster cast                                                   (2 Points)  [ ] Age= 75 years                                              (3 Points)                 [ ] Bed bound for more than 72 hours                 (2 Points)    DISEASE RELATED RISK FACTORS                                               GENDER SPECIFIC FACTORS  [ ] Edema in the lower extremities                       (1 Point)                  [ ] Pregnancy                                                     (1 Point)  [ ] Varicose veins                                               (1 Point)                  [ ] Post-partum < 6 weeks                                   (1 Point)             [ ] BMI > 25 Kg/m2                                            (1 Point)                  [ ] Hormonal therapy  or oral contraception          (1 Point)                 [ ] Sepsis (in the previous month)                        (1 Point)                  [ ] History of pregnancy complications                 (1 point)  [ ] Pneumonia or serious lung disease                                               [ ] Unexplained or recurrent                     (1 Point)           (in the previous month)                               (1 Point)  [ ] Abnormal pulmonary function test                     (1 Point)                 SURGERY RELATED RISK FACTORS (include planned surgeries)  [ ] Acute myocardial infarction                              (1 Point)                 [ ]  Section                                             (1 Point)  [ ] Congestive heart failure (in the previous month)  (1 Point)         [ ] Minor surgery                                                  (1 Point)   [ ] Inflammatory bowel disease                             (1 Point)                 [ ] Arthroscopic surgery                                        (2 Points)  [ ] Central venous access                                      (2 Points)                [ ] General surgery lasting more than 45 minutes   (2 Points)       [ ] Stroke (in the previous month)                          (5 Points)               [ ] Elective arthroplasty                                         (5 Points)            [x] current or past malignancy                              (2 Points)                                                                                                       HEMATOLOGY RELATED FACTORS                                                 TRAUMA RELATED RISK FACTORS  [ ] Prior episodes of VTE                                     (3 Points)                [ ] Fracture of the hip, pelvis, or leg                       (5 Points)  [ ] Positive family history for VTE                         (3 Points)                 [ ] Acute spinal cord injury (in the previous month)  (5 Points)  [ ] Prothrombin 27462 A                                     (3 Points)                 [ ] Paralysis  (less than 1 month)                             (5 Points)  [ ] Factor V Leiden                                             (3 Points)                  [ ] Multiple Trauma within 1 month                        (5 Points)  [ ] Lupus anticoagulants                                     (3 Points)                                                           [ ] Anticardiolipin antibodies                               (3 Points)                                                       [ ] High homocysteine in the blood                      (3 Points)                                             [ ] Other congenital or acquired thrombophilia      (3 Points)                                                [ ] Heparin induced thrombocytopenia                  (3 Points)                                          Total Score [     4     ]    Risk:  Very low 0   Low 1 to 2   Moderate 3 to 4   High =5       VTE Prophylasix Recommednations:  [x] mechanical pneumatic compression devices                                      [ ] contraindicated: _____________________  [ ] chemo prophylasix                                                                                   [x] contraindicated __held chemical dvt ppx due to newly stent of aneurysm; will start if post angio imaging stable or possible discharge home tomorrow___________________    **** HIGH LIKELIHOOD DVT PRESENT ON ADMISSION  [ ] (please order LE dopplers within 24 hours of admission)

## 2019-03-12 NOTE — DISCHARGE NOTE PROVIDER - NSDCFUADDINST_GEN_ALL_CORE_FT
Please follow up Neurosurgeon Dr. Martinez in 1-2 weeks. Please call office to make appointment. Address incidentally found Lt ICA aneurysm   Please follow up with Primary Care Physician in one week (3/19). Please call office to make appointment.     Please do not engage in strenuous activity, heavy lifting, drive, or return to work or school until cleared by Neurosurgeon. Do not stop your Aspirin or Plavix unless instructed by neurosurgeon. Do not take NSAIDS until confirming with neurosurgeon. Please follow up Neurosurgeon Dr. Martinez in 1-2 weeks. Please call office to make appointment. Address incidentally found Lt ICA aneurysm   Please follow up with Primary Care Physician in one week (3/19). Please call office to make appointment.     Please do not engage in strenuous activity, heavy lifting, drive, or return to work or school until cleared by Neurosurgeon. Do not stop your Aspirin or Plavix unless instructed by neurosurgeon. Do not take NSAIDS until confirming with neurosurgeon.    Please return immediately to the ED for any of the following: fevers, bleeding, swelling, pain not relieved by medication, increased sluggishness or irritability, increased nausea or vomiting, inability to tolerate foods or liquids, increased numbness/tingling/ or inability to move extremities, seizures.

## 2019-03-12 NOTE — PROGRESS NOTE ADULT - SUBJECTIVE AND OBJECTIVE BOX
Vital Signs Last 24 Hrs  T(C): 36.5 (11 Mar 2019 23:00), Max: 36.5 (11 Mar 2019 23:00)  T(F): 97.7 (11 Mar 2019 23:00), Max: 97.7 (11 Mar 2019 23:00)  HR: 56 (11 Mar 2019 23:00) (49 - 69)  BP: 111/63 (11 Mar 2019 23:00) (104/60 - 158/66)  BP(mean): 82 (11 Mar 2019 23:00) (75 - 95)  RR: 14 (11 Mar 2019 23:00) (14 - 16)  SpO2: 97% (11 Mar 2019 23:00) (96% - 100%)    EXAM  AOx3, FC, PERRL, EOMI, no facial   5/5 throughout, no drift  SILT  no clonus

## 2019-03-12 NOTE — PROGRESS NOTE ADULT - ASSESSMENT
ASSESSMENT:   s/p elective DSA R supraclinoid ICA aneurysm intervention 3/11.    NEURO:  MRI/MRA   Pain control  Activity: [x] OOB as tolerated [] Bedrest for 4 hrs while safeguard in place[] PT [] OT [] PMNR    PULM: RA, IS    CV:  -150mmHg  cont home meds    RENAL:  IVL    GI:  Diet: regular  GI prophylaxis [x] not indicated [] PPI [] other:  Bowel regimen [x] colace [x] senna [] other:    ENDO:   Goal euglycemia (-180)    HEME/ONC:  VTE prophylaxis: [x] SCDs [x] chemoprophylaxis - start tonight if no signs of bleeding [x] high risk of DVT/PE on admission due to: h/o malignancy    ID:  Toshia-op antibiotics    SOCIAL/FAMILY:  [] awaiting [x] updated at bedside [] family meeting    CODE STATUS:  [x] Full Code [] DNR [] DNI [] Palliative/Comfort Care    DISPOSITION:  d/c home vs. floor    not critically ill
ASSESSMENT:   s/p elective DSA R supraclinoid ICA aneurysm intervention 3/11.    NEURO:  neurochecks q1 hr  MRI/MRA   Pain control  Activity: [] OOB as tolerated [x] Bedrest for 4 hrs while safeguard in place[] PT [] OT [] PMNR    PULM: RA, IS    CV:  -150mmHg, d/c a-line in AM if hemodynamically stable  restart home meds    RENAL:  IVF until good PO intake    GI:  Diet: Dysphagia screen and then advance diet as tolerated in 4 hrs post-angio  GI prophylaxis [x] not indicated [] PPI [] other:  Bowel regimen [x] colace [x] senna [] other:    ENDO:   Goal euglycemia (-180)    HEME/ONC:  VTE prophylaxis: [x] SCDs [x] chemoprophylaxis - start tonight if no signs of bleeding [x] high risk of DVT/PE on admission due to: h/o malignancy    ID:  Toshia-op antibiotics    SOCIAL/FAMILY:  [] awaiting [x] updated at bedside [] family meeting    CODE STATUS:  [x] Full Code [] DNR [] DNI [] Palliative/Comfort Care    xDISPOSITION:  [x] ICU [] Stroke Unit [] Floor [] EMU [] RCU [] PCU    [x] Patient is at high risk of neurologic deterioration/death due to: cerebral bleeding, swelling    Time spent: 35 critical care minutes
PLAN  Needs MRI/ NOVA  Deflate safeguard 18:30  Poss d/ c home in AM
elective cerebral aneurysm embolization, post-operative day 0  - ASA, clopidogrel  - MR NOVA tomorrow  - -140mmHg; continue metoprolol  - Statin for dyslipidemia  - Outpatient treatment of unruptured LICA aneurysm    40 minutes critical care time

## 2019-03-12 NOTE — DISCHARGE NOTE NURSING/CASE MANAGEMENT/SOCIAL WORK - NSDCDPATPORTLINK_GEN_ALL_CORE
You can access the [facility], offered by [facility], by registering with the following website: [email or web address]

## 2019-03-12 NOTE — DISCHARGE NOTE PROVIDER - HOSPITAL COURSE
73 y.o male with PMHx of CAD (s/p ENRIQUE x1 in LAD 2006), prostate carcinoma (s/p XRT 2009) s/p cerebral angiogram 12/2018 found to have bilateral supraclinoid internal carotid artery aneurysms. Patient underwent cerebral angiogram with placement of three pipeline stents of right supraclinoid aneurysm. Patient was monitored in the recovery room post angio and safeguard was deflated. Post angio patient had MRI/MRA NOVA done.  Patient is on ASA and Plavix and had P2Y12 done which was stable.  Patient was seen by physical therapy who recommended... Patient was cleared for discharge by neurosurgeon.  Patient is stable for discharge. 73 y.o male with PMHx of CAD (s/p ENRIQUE x1 in LAD 2006), prostate carcinoma (s/p XRT 2009) s/p cerebral angiogram 12/2018 found to have bilateral supraclinoid internal carotid artery aneurysms. Patient underwent cerebral angiogram with placement of three pipeline stents of right supraclinoid aneurysm. Was also found to have incidental finding of Lt ICA aneurysm.  Patient was monitored in the recovery room post angio and safeguard was deflated. Post angio patient had MRI/MRA NOVA done which was reviewed by Neurosurgery.  Patient is on ASA and Plavix and had P2Y12 done which was stable.  Patient was cleared for discharge by neurosurgeon.  Patient is stable for discharge. 73 y.o male with PMHx of CAD (s/p ENRIQUE x1 in LAD 2006), prostate carcinoma (s/p XRT 2009) s/p cerebral angiogram 12/2018 found to have bilateral supraclinoid internal carotid artery aneurysms. Patient underwent cerebral angiogram with placement of three pipeline stents of right supraclinoid aneurysm. Was also found to have incidental finding of Lt ICA aneurysm.  Patient was monitored in the recovery room post angio and safeguard was deflated. Post angio patient had MRI/MRA NOVA done which was reviewed by Neurosurgery.  Patient is on ASA 325mg and Plavix 75mg and had P2Y12 done which was stable.  Patient was cleared for discharge by neurosurgeon.  Patient is stable for discharge.

## 2019-03-25 ENCOUNTER — APPOINTMENT (OUTPATIENT)
Dept: NEUROSURGERY | Facility: CLINIC | Age: 74
End: 2019-03-25
Payer: MEDICARE

## 2019-03-25 PROCEDURE — 99213 OFFICE O/P EST LOW 20 MIN: CPT

## 2019-04-08 NOTE — PHYSICAL EXAM
[FreeTextEntry1] : Awake, alert, and oriented x 3. VSS. In no apparent distress. Short and long term memory intact. Speech is clear and appropriate. Affect is normal. Voice is strong. Respirations easy and even. Normal skin color and pigmentation. The sclera and conjunctiva normal. Ears, nose, and neck normal in appearance. EOMI, no nystagmus, facial sensation intact symmetrically, face symmetrical, hearing intact bilaterally, tongue and palate midline, head turning and shoulder shrug symmetric and no tongue deviation with protrusion. No pronator drift. No past-pointing, no tremors noted, no dysdiadochokinesia, and finger to nose dysmetria was not present. Romberg negative. \par Ambidextrous , more dominant LEFT\par \par Rises from a seated position in a comfortable fashion.\par \par Gait is well coordinated and stable without the use of an assistive device. Able to perform tandem walk without loss of balance. Motor strength in the upper extremities 5/5 in the biceps, triceps, and hand . Motor strength in the lower extremities is 5/5 in the iliopsoas, quadriceps, and hamstrings. \par \par  \par

## 2019-04-08 NOTE — HISTORY OF PRESENT ILLNESS
[FreeTextEntry1] : 73 y.o male with PMHx of CAD (s/p ENRIQUE x1 in LAD 2006), prostate carcinoma (s/p \par XRT 2009) s/p cerebral angiogram 12/2018 found to have bilateral supraclinoid \par internal carotid artery aneurysms. Patient underwent cerebral angiogram with \par placement of three pipeline stents of right supraclinoid aneurysm. Was also \par found to have incidental finding of Lt ICA aneurysm.  Patient was monitored in \par the recovery room post angio and safeguard was deflated. Post angio patient had \par MRI/MRA NOVA done which was reviewed by Neurosurgery.  Patient is on ASA 325mg \par and Plavix 75mg and had P2Y12 done which was stable.  Patient was cleared for \par discharge by neurosurgeon.  Patient is stable for discharge. \par \par Today, pt presents to the office to discuss treatment options for left sided aneurysm. He will call Brandee and schedule. He states he feels well without c/o.\par \par Plan: plan and treat left sided aneurysm

## 2019-05-21 ENCOUNTER — OUTPATIENT (OUTPATIENT)
Dept: OUTPATIENT SERVICES | Facility: HOSPITAL | Age: 74
LOS: 1 days | End: 2019-05-21
Payer: MEDICARE

## 2019-05-21 VITALS
DIASTOLIC BLOOD PRESSURE: 69 MMHG | RESPIRATION RATE: 20 BRPM | HEART RATE: 55 BPM | TEMPERATURE: 98 F | SYSTOLIC BLOOD PRESSURE: 121 MMHG | HEIGHT: 71 IN | WEIGHT: 156.75 LBS | OXYGEN SATURATION: 98 %

## 2019-05-21 DIAGNOSIS — Z01.818 ENCOUNTER FOR OTHER PREPROCEDURAL EXAMINATION: ICD-10-CM

## 2019-05-21 DIAGNOSIS — Z98.890 OTHER SPECIFIED POSTPROCEDURAL STATES: Chronic | ICD-10-CM

## 2019-05-21 DIAGNOSIS — I67.1 CEREBRAL ANEURYSM, NONRUPTURED: ICD-10-CM

## 2019-05-21 DIAGNOSIS — Z90.49 ACQUIRED ABSENCE OF OTHER SPECIFIED PARTS OF DIGESTIVE TRACT: Chronic | ICD-10-CM

## 2019-05-21 DIAGNOSIS — Z95.5 PRESENCE OF CORONARY ANGIOPLASTY IMPLANT AND GRAFT: Chronic | ICD-10-CM

## 2019-05-21 DIAGNOSIS — Q28.2 ARTERIOVENOUS MALFORMATION OF CEREBRAL VESSELS: ICD-10-CM

## 2019-05-21 LAB
ANION GAP SERPL CALC-SCNC: 13 MMOL/L — SIGNIFICANT CHANGE UP (ref 5–17)
BLD GP AB SCN SERPL QL: NEGATIVE — SIGNIFICANT CHANGE UP
BUN SERPL-MCNC: 19 MG/DL — SIGNIFICANT CHANGE UP (ref 7–23)
CALCIUM SERPL-MCNC: 9.3 MG/DL — SIGNIFICANT CHANGE UP (ref 8.4–10.5)
CHLORIDE SERPL-SCNC: 103 MMOL/L — SIGNIFICANT CHANGE UP (ref 96–108)
CO2 SERPL-SCNC: 24 MMOL/L — SIGNIFICANT CHANGE UP (ref 22–31)
CREAT SERPL-MCNC: 0.65 MG/DL — SIGNIFICANT CHANGE UP (ref 0.5–1.3)
GLUCOSE SERPL-MCNC: 107 MG/DL — HIGH (ref 70–99)
HCT VFR BLD CALC: 44.3 % — SIGNIFICANT CHANGE UP (ref 39–50)
HGB BLD-MCNC: 14.2 G/DL — SIGNIFICANT CHANGE UP (ref 13–17)
MCHC RBC-ENTMCNC: 28.1 PG — SIGNIFICANT CHANGE UP (ref 27–34)
MCHC RBC-ENTMCNC: 32.1 GM/DL — SIGNIFICANT CHANGE UP (ref 32–36)
MCV RBC AUTO: 87.7 FL — SIGNIFICANT CHANGE UP (ref 80–100)
PLATELET # BLD AUTO: 191 K/UL — SIGNIFICANT CHANGE UP (ref 150–400)
POTASSIUM SERPL-MCNC: 4.2 MMOL/L — SIGNIFICANT CHANGE UP (ref 3.5–5.3)
POTASSIUM SERPL-SCNC: 4.2 MMOL/L — SIGNIFICANT CHANGE UP (ref 3.5–5.3)
RBC # BLD: 5.05 M/UL — SIGNIFICANT CHANGE UP (ref 4.2–5.8)
RBC # FLD: 13.3 % — SIGNIFICANT CHANGE UP (ref 10.3–14.5)
RH IG SCN BLD-IMP: POSITIVE — SIGNIFICANT CHANGE UP
SODIUM SERPL-SCNC: 140 MMOL/L — SIGNIFICANT CHANGE UP (ref 135–145)
WBC # BLD: 6.27 K/UL — SIGNIFICANT CHANGE UP (ref 3.8–10.5)
WBC # FLD AUTO: 6.27 K/UL — SIGNIFICANT CHANGE UP (ref 3.8–10.5)

## 2019-05-21 PROCEDURE — 85027 COMPLETE CBC AUTOMATED: CPT

## 2019-05-21 PROCEDURE — 86901 BLOOD TYPING SEROLOGIC RH(D): CPT

## 2019-05-21 PROCEDURE — G0463: CPT

## 2019-05-21 PROCEDURE — 86850 RBC ANTIBODY SCREEN: CPT

## 2019-05-21 PROCEDURE — 86900 BLOOD TYPING SEROLOGIC ABO: CPT

## 2019-05-21 PROCEDURE — 80048 BASIC METABOLIC PNL TOTAL CA: CPT

## 2019-05-21 NOTE — H&P PST ADULT - HISTORY OF PRESENT ILLNESS
72 yo male with PMH of CAD (s/p ENRIQUE x1 in LAD 2006), prostate carcinoma (s/p XRT 2009) s/p cerebral angiogram 12/2018 found to have bilateral supraclinoid internal carotid artery aneurysms planned for cerebral angiogram with embolization on 3/11/19. 72 yo male with PMH of CAD (s/p ENRIQUE x1 in LAD 2006), prostate carcinoma (s/p XRT 2009) s/p cerebral angiogram 12/2018 found to have bilateral supraclinoid internal carotid artery aneurysms -s/p Right  cerebral angiogram with embolization with 3 pipeline stents on 3/11/19. Now coming in for Left pipeline embo for LEFT ICA on 5/28/19.

## 2019-05-21 NOTE — H&P PST ADULT - NSICDXPASTMEDICALHX_GEN_ALL_CORE_FT
PAST MEDICAL HISTORY:  CAD (coronary artery disease) denies MI    Cerebral aneurysm     Dyslipidemia     Prostate cancer s/p XRT 2009 PAST MEDICAL HISTORY:  CAD (coronary artery disease) denies MI    Cerebral aneurysm bilateral    Dyslipidemia     Prostate cancer s/p XRT 2009

## 2019-05-21 NOTE — H&P PST ADULT - NSICDXPASTSURGICALHX_GEN_ALL_CORE_FT
PAST SURGICAL HISTORY:  H/O hernia repair x3  1980, 1983, 2002 with mesh  left inguinal    S/P appendectomy     S/P drug eluting coronary stent placement LAD 2006 PAST SURGICAL HISTORY:  H/O hernia repair x3  1980, 1983, 2002 with mesh  left inguinal    S/P appendectomy     S/P coil embolization of cerebral aneurysm right 3/2019    S/P drug eluting coronary stent placement LAD 2006

## 2019-05-21 NOTE — H&P PST ADULT - NSICDXPROBLEM_GEN_ALL_CORE_FT
PROBLEM DIAGNOSES  Problem: Cerebral aneurysm  Assessment and Plan:  Left pipeline embo for LEFT ICA on 5/28/19.   To continue on aspirin & plavix until procedure

## 2019-05-25 PROBLEM — I67.1 CEREBRAL ANEURYSM, NONRUPTURED: Chronic | Status: ACTIVE | Noted: 2019-05-21

## 2019-05-28 ENCOUNTER — APPOINTMENT (OUTPATIENT)
Dept: NEUROSURGERY | Facility: HOSPITAL | Age: 74
End: 2019-05-28
Payer: MEDICARE

## 2019-05-28 ENCOUNTER — INPATIENT (INPATIENT)
Facility: HOSPITAL | Age: 74
LOS: 0 days | Discharge: HOME | DRG: 27 | End: 2019-05-29
Attending: NEUROLOGICAL SURGERY | Admitting: NEUROLOGICAL SURGERY
Payer: MEDICARE

## 2019-05-28 VITALS
DIASTOLIC BLOOD PRESSURE: 59 MMHG | OXYGEN SATURATION: 99 % | HEART RATE: 56 BPM | TEMPERATURE: 97 F | SYSTOLIC BLOOD PRESSURE: 105 MMHG | RESPIRATION RATE: 16 BRPM

## 2019-05-28 DIAGNOSIS — Z90.49 ACQUIRED ABSENCE OF OTHER SPECIFIED PARTS OF DIGESTIVE TRACT: Chronic | ICD-10-CM

## 2019-05-28 DIAGNOSIS — Z98.890 OTHER SPECIFIED POSTPROCEDURAL STATES: Chronic | ICD-10-CM

## 2019-05-28 DIAGNOSIS — Q28.2 ARTERIOVENOUS MALFORMATION OF CEREBRAL VESSELS: ICD-10-CM

## 2019-05-28 DIAGNOSIS — Z95.5 PRESENCE OF CORONARY ANGIOPLASTY IMPLANT AND GRAFT: Chronic | ICD-10-CM

## 2019-05-28 LAB
ANION GAP SERPL CALC-SCNC: 11 MMOL/L — SIGNIFICANT CHANGE UP (ref 5–17)
BUN SERPL-MCNC: 12 MG/DL — SIGNIFICANT CHANGE UP (ref 7–23)
CALCIUM SERPL-MCNC: 8.1 MG/DL — LOW (ref 8.4–10.5)
CHLORIDE SERPL-SCNC: 106 MMOL/L — SIGNIFICANT CHANGE UP (ref 96–108)
CO2 SERPL-SCNC: 20 MMOL/L — LOW (ref 22–31)
CREAT SERPL-MCNC: 0.53 MG/DL — SIGNIFICANT CHANGE UP (ref 0.5–1.3)
GLUCOSE SERPL-MCNC: 215 MG/DL — HIGH (ref 70–99)
HCT VFR BLD CALC: 40 % — SIGNIFICANT CHANGE UP (ref 39–50)
HGB BLD-MCNC: 13.7 G/DL — SIGNIFICANT CHANGE UP (ref 13–17)
MAGNESIUM SERPL-MCNC: 1.9 MG/DL — SIGNIFICANT CHANGE UP (ref 1.6–2.6)
MCHC RBC-ENTMCNC: 29.6 PG — SIGNIFICANT CHANGE UP (ref 27–34)
MCHC RBC-ENTMCNC: 34.2 GM/DL — SIGNIFICANT CHANGE UP (ref 32–36)
MCV RBC AUTO: 86.5 FL — SIGNIFICANT CHANGE UP (ref 80–100)
PA ADP PRP-ACNC: 51 PRU — LOW (ref 194–417)
PHOSPHATE SERPL-MCNC: 3.9 MG/DL — SIGNIFICANT CHANGE UP (ref 2.5–4.5)
PLATELET # BLD AUTO: 180 K/UL — SIGNIFICANT CHANGE UP (ref 150–400)
POTASSIUM SERPL-MCNC: 3.6 MMOL/L — SIGNIFICANT CHANGE UP (ref 3.5–5.3)
POTASSIUM SERPL-SCNC: 3.6 MMOL/L — SIGNIFICANT CHANGE UP (ref 3.5–5.3)
RBC # BLD: 4.62 M/UL — SIGNIFICANT CHANGE UP (ref 4.2–5.8)
RBC # FLD: 12.1 % — SIGNIFICANT CHANGE UP (ref 10.3–14.5)
SODIUM SERPL-SCNC: 137 MMOL/L — SIGNIFICANT CHANGE UP (ref 135–145)
WBC # BLD: 5.5 K/UL — SIGNIFICANT CHANGE UP (ref 3.8–10.5)
WBC # FLD AUTO: 5.5 K/UL — SIGNIFICANT CHANGE UP (ref 3.8–10.5)

## 2019-05-28 PROCEDURE — 70496 CT ANGIOGRAPHY HEAD: CPT | Mod: 26

## 2019-05-28 PROCEDURE — 61624 TCAT PERM OCCLS/EMBOLJ CNS: CPT

## 2019-05-28 PROCEDURE — 99291 CRITICAL CARE FIRST HOUR: CPT

## 2019-05-28 PROCEDURE — 36224 PLACE CATH CAROTD ART: CPT | Mod: 50

## 2019-05-28 PROCEDURE — 75894 X-RAYS TRANSCATH THERAPY: CPT | Mod: 26

## 2019-05-28 PROCEDURE — 36228 PLACE CATH INTRACRANIAL ART: CPT | Mod: LT

## 2019-05-28 PROCEDURE — 75898 FOLLOW-UP ANGIOGRAPHY: CPT | Mod: 26

## 2019-05-28 RX ORDER — SIMVASTATIN 20 MG/1
20 TABLET, FILM COATED ORAL AT BEDTIME
Refills: 0 | Status: DISCONTINUED | OUTPATIENT
Start: 2019-05-28 | End: 2019-05-29

## 2019-05-28 RX ORDER — METOPROLOL TARTRATE 50 MG
25 TABLET ORAL
Refills: 0 | Status: DISCONTINUED | OUTPATIENT
Start: 2019-05-28 | End: 2019-05-29

## 2019-05-28 RX ORDER — SODIUM CHLORIDE 9 MG/ML
1000 INJECTION INTRAMUSCULAR; INTRAVENOUS; SUBCUTANEOUS
Refills: 0 | Status: DISCONTINUED | OUTPATIENT
Start: 2019-05-28 | End: 2019-05-29

## 2019-05-28 RX ORDER — CLOPIDOGREL BISULFATE 75 MG/1
75 TABLET, FILM COATED ORAL DAILY
Refills: 0 | Status: DISCONTINUED | OUTPATIENT
Start: 2019-05-28 | End: 2019-05-29

## 2019-05-28 RX ORDER — ASPIRIN/CALCIUM CARB/MAGNESIUM 324 MG
325 TABLET ORAL DAILY
Refills: 0 | Status: DISCONTINUED | OUTPATIENT
Start: 2019-05-28 | End: 2019-05-29

## 2019-05-28 RX ORDER — METOPROLOL TARTRATE 50 MG
25 TABLET ORAL DAILY
Refills: 0 | Status: DISCONTINUED | OUTPATIENT
Start: 2019-05-28 | End: 2019-05-28

## 2019-05-28 RX ADMIN — SODIUM CHLORIDE 70 MILLILITER(S): 9 INJECTION INTRAMUSCULAR; INTRAVENOUS; SUBCUTANEOUS at 16:02

## 2019-05-28 RX ADMIN — SIMVASTATIN 20 MILLIGRAM(S): 20 TABLET, FILM COATED ORAL at 21:46

## 2019-05-28 NOTE — CHART NOTE - NSCHARTNOTEFT_GEN_A_CORE
Interventional Neuro- Radiology   Procedure Note PA-C    Procedure: Selective Cerebral Angiography and pipeline placement   Pre- Procedure Diagnosis: left supraclinoid internal carotid artery   Post- Procedure Diagnosis:    : Dr. Yves Martinez  Fellow:     Dr Katherine Schmitt   Resident: Dr Ta Bañuelos   Physician Assistant: Maria Isabel Delgado PA-C    Nurse:                   Rachael Jeffries RN  Radiologic Tech:   Holger Candelaria LRT         Celestina Calloway LRT  Anesthesiologist:  Sheath:    I/Os: EBL less than 10cc  IV fluids:     cc  Urine output     cc   Contrast Omnipaque 240      cc        baseline       Heparin 4,000 units           Antibiotics: Ancef 2 grams    Vitals: /51         HR 57     Spo2  99%    Preliminary Report:  Using a 5 Stateless arrow sheath to the right groin under general anesthesia via left internal carotid artery and right internal carotid artery a selective cerebral angiography was performed and again demonstrated a left supraclinoid artery aneurysm Official note to follow).  Patient tolerated procedure well, hemodynamically stable, no change in neurological status compared to baseline.  Results discussed with neurosurgery, patient and patient's  family.  Right groin sheath was removed, a star close device and manual compression held to hemostasis  for  21 minutes, no active bleeding, no hematoma quick clot and safeguard balloon dressing applied at Interventional Neuro- Radiology   Procedure Note PA-C    Procedure: Selective Cerebral Angiography and pipeline placement X 2   Pre- Procedure Diagnosis: left supraclinoid internal carotid artery   Post- Procedure Diagnosis:    : Dr. Yves Martinez  Fellow:     Dr Katherine Schmitt   Resident: Dr Ta Bañuelos   Physician Assistant: Maria Isabel Delgado PA-C    Nurse:                   Rachael Jeffries RN  Radiologic Tech:   Holger Candelaria LRT         Celestina Calloway LRT  Anesthesiologist:   Dr Natasha Han                CRNA Mariam Clinton  CRNA student Salvatore Munguia   Sheath:                  4 [place] [employer] sheath     I/Os: EBL less than 10cc  IV fluids:800cc  Urine output 700cc Contrast Omnipaque 240 127cc     baseline       Heparin 4,000 units             Antibiotics: Ancef 2 grams         Nicardipine 3mg Left ICA    Vitals: /51         HR 57     Spo2  99%    Preliminary Report:  Using a 5 [place] arrow sheath to the right groin under general anesthesia via left internal carotid artery and right internal carotid artery a selective cerebral angiography was performed and again demonstrated a left supraclinoid artery aneurysm. Patient underwent a selective cerebral angiography and pipeline placement X 2.  Official note to follow.  Patient tolerated procedure well, hemodynamically stable, no change in neurological status compared to baseline.  Results discussed with neuro ICU team, patient and patient's family. Right groin sheath was removed, a star close device and manual compression held to hemostasis for 20 minutes, no active bleeding, no hematoma quick clot and safeguard balloon dressing applied at 1445 hours. MRI/MRA NOVA later and ASA 325mg and Plavix 75mg daily, for tomorrow am. 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 Yves Martinez  Fellow:     Dr Katherine Schmitt   Resident: Dr Ta Bañuelos   Physician Assistant: Maria Isabel Delgado PA-C    Nurse:                   Rachael Jeffries RN  Radiologic Tech:   Holger Candelaria LRT         Celestina Calloway LRT  Anesthesiologist:   Dr Natasha Han              CRNA Mariam Clinton  CRNA student [place]   Sheath:                  4 [place] Fubuki sheath     I/Os: EBL less than 10cc  IV fluids:800cc  Urine output 700cc Contrast Omnipaque 240 127cc     baseline       Heparin 4,000 units          pipeline stent 4mm by 12mm    2nd pipeline 3.75mm by 10mm        Antibiotics: Ancef 2 grams         Nicardipine 3mg Left ICA    Vitals: /51         HR 57     Spo2  99%    Preliminary Report:  Using a 5 German arrow sheath to the right groin under general anesthesia via left internal carotid artery and right internal carotid artery a selective cerebral angiography was performed and again demonstrated a left supraclinoid artery aneurysm. Patient underwent a selective cerebral angiography and pipeline placement X 2.  Official note to follow.  Patient tolerated procedure well, hemodynamically stable, no change in neurological status compared to baseline.  Results discussed with neuro ICU team, patient and patient's family. Right groin sheath was removed, a star close device and manual compression held to hemostasis for 20 minutes, no active bleeding, no hematoma quick clot and safeguard balloon dressing applied at 1445 hours. MRI/MRA intracranial (NOVA) later and ASA 325mg and Plavix 75mg daily, for tomorrow am. 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 Yves Martinez  Fellow:     Dr Katehrine Schmitt   Resident: Dr Ta Bañuelos   Physician Assistant: Maria Isabel Delgado PA-C    Nurse:                   Rachael Jeffries RN  Radiologic Tech:   Holger Candelaria LRT          Celestina Calloway LRT  Anesthesiologist:   Dr Natasha Han              CRNA Mariam Clinton  CRNA student [place]   Sheath:                  4 [place]oc[place] Fubuki sheath     I/Os: EBL less than 10cc  IV fluids:800cc  Urine output 700cc Contrast Omnipaque 240 127cc     baseline       Heparin 4,000 units          pipeline stent 4mm by 12mm    2nd pipeline 3.75mm by 10mm        Antibiotics: Ancef 2 grams         Nicardipine 3mg Left ICA    Vitals: /51         HR 57     Spo2  99%    Preliminary Report:  Using a 5 Moroccan arrow sheath to the right groin under general anesthesia via left internal carotid artery and right internal carotid artery a selective cerebral angiography was performed and again demonstrated a left supraclinoid artery aneurysm. Patient underwent a selective cerebral angiography and pipeline placement X 2.  Official note to follow.  Patient tolerated procedure well, hemodynamically stable, no change in neurological status compared to baseline.  Results discussed with neuro ICU team, patient and patient's family. Right groin sheath was removed, a star close device and manual compression held to hemostasis for 20 minutes, no active bleeding, no hematoma quick clot and safeguard balloon dressing applied at 1445 hours. MRI/MRA intracranial (NOVA) later and ASA 325mg and Plavix 75mg daily, for tomorrow am. 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 Yves Martinez  Fellow:     Dr Katherine Schmitt   Resident: Dr Ta Bañuelos   Physician Assistant: Maria Isabel Delgado PA-C    Nurse:                   Rachael Jeffries RN  Radiologic Tech:   Holger Candelaria LRT        Celestina Calloway LRT  Anesthesiologist:   Dr Natasha Han              CRNA Mariam Clinton  CRNA student [place]   Sheath:                  4 [place] Fubuki sheath     I/Os: EBL less than 10cc  IV fluids:800cc  Urine output 700cc Contrast Omnipaque 240 127cc     baseline       Heparin 4,000 units          pipeline stent 4mm by 12mm    2nd pipeline 3.75mm by 10mm        Antibiotics: Ancef 2 grams         Nicardipine 3mg Left ICA    Vitals: /51         HR 57     Spo2  99%    Preliminary Report:  Using a 5 [place] arrow sheath to the right groin under general anesthesia via left internal carotid artery and right internal carotid artery a selective cerebral angiography was performed and again demonstrated a left supraclinoid artery aneurysm. Patient underwent a selective cerebral angiography and pipeline placement X 2.  Official note to follow.  Patient tolerated procedure well, hemodynamically stable, no change in neurological status compared to baseline.  Results discussed with neuro ICU team, patient and patient's family. Right groin sheath was removed, a star close device and manual compression held to hemostasis for 20 minutes, no active bleeding, no hematoma quick clot and safeguard balloon dressing applied at 1445 hours. MRI/MRA intracranial (NOVA) later and ASA 325mg and Plavix 75mg daily, for tomorrow am. 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 Yves Martinez  Fellow:     Dr Katherine Schmitt   Resident: Dr Ta Bañuelos   Physician Assistant: Maria Isabel Delgado PA-C    Nurse:                   Rachael Jeffries RN  Radiologic Tech:   Holger Candelaria LRT        Celestina Calloway LRT  Anesthesiologist:   Dr Natasha Han              CRNA Mariam Clinton  CRNA student [place]   Sheath:                 4 [place]i Fubuki sheath     I/Os: EBL less than 10cc  IV fluids:800cc  Urine output 700cc Contrast Omnipaque 240 127cc     baseline       Heparin 4,000 units          pipeline stent 4mm by 12mm    2nd pipeline 3.75mm by 10mm        Antibiotics: Ancef 2 grams         Nicardipine 3mg Left ICA    Vitals: /51         HR 57     Spo2  99%    Preliminary Report:  Using a 5 Yemeni arrow sheath to the right groin under general anesthesia via left internal carotid artery and right internal carotid artery a selective cerebral angiography was performed and again demonstrated a left supraclinoid artery aneurysm. Patient underwent a selective cerebral angiography and pipeline placement X 2.  Official note to follow.  Patient tolerated procedure well, hemodynamically stable, no change in neurological status compared to baseline. Results discussed with neuro ICU team, patient and patient's family. Right groin sheath was removed, a star close device and manual compression held to hemostasis for 20 minutes, no active bleeding, no hematoma quick clot and safeguard balloon dressing applied at 1445 hours. MRI/MRA intracranial (NOVA) later and ASA 325mg and Plavix 75mg daily, for tomorrow am.

## 2019-05-28 NOTE — PROGRESS NOTE ADULT - ASSESSMENT
ASSESSMENT/PLAN:  s/p Left pipeline stent for LEFT ICA aneurysm POD 0    NEURO:    Neurochecks q1 hrs,  MR NOVA in the AM   Pain control  ASA/Plavix daily   check P2y12   safe guard out in 6 hrs   Activity: [] mobilize as tolerated [] Bedrest [] PT [] OT [] PMNR    PULM:  O2 sat > 92%    CV:  SBP goal 100 - 160  HTN: c/w metorpolol  HLD: c/w Lipitor     RENAL:  Fluids:  NS @ 75  d/c downs after safe guard out     GI:    Diet: advance diet as tolerated   GI prophylaxis [] not indicated [] PPI [] other:  Bowel regimen [x] colace [x] senna [] other:    ENDO:   Goal euglycemia (-180)    HEME/ONC:  VTE prophylaxis: [] SCDs [] chemoprophylaxis [x] hold chemoprophylaxis due to: fresh post op     ID:  afebrile     SOCIAL/FAMILY:  [x] awaiting [] updated at bedside [] family meeting    CODE STATUS:  [x] Full Code [] DNR [] DNI [] Palliative/Comfort Care    DISPOSITION:  [x] ICU [] Stroke Unit [] Floor [] EMU [] RCU [] PCU    [x] Patient is at high risk of neurologic deterioration/death due to:  seziures, hemorrhage, herniation       Time spent: 45 critical care minutes    Contact: [phone] ASSESSMENT/PLAN:  s/p Left pipeline stent for LEFT ICA aneurysm POD 0    NEURO:    Neurochecks q1 hrs,  MR NOVA in the AM   Pain control  ASA/Plavix daily   check P2y12   safe guard out in 6 hrs   Activity: [] mobilize as tolerated [] Bedrest [] PT [] OT [] PMNR    PULM:  O2 sat > 92%    CV:  SBP goal 100 - 160  HTN: c/w metorpolol  HLD: c/w Lipitor     RENAL:  Fluids:  NS @ 75  d/c downs after safe guard out     GI:    Diet: advance diet as tolerated   GI prophylaxis [] not indicated [] PPI [] other:  Bowel regimen [x] colace [x] senna [] other:    ENDO:   Goal euglycemia (-180)    HEME/ONC:  VTE prophylaxis: [] SCDs [] chemoprophylaxis [x] hold chemoprophylaxis due to: fresh post op     ID:  afebrile     SOCIAL/FAMILY:  [x] awaiting [] updated at bedside [] family meeting    CODE STATUS:  [x] Full Code [] DNR [] DNI [] Palliative/Comfort Care    DISPOSITION:  [x] ICU [] Stroke Unit [] Floor [] EMU [] RCU [] PCU    [x] Patient is at high risk of neurologic deterioration/death due to:  seziures, hemorrhage, stroke  Time spent: 35 critical care minutes    Contact: [phone]

## 2019-05-28 NOTE — CHART NOTE - NSCHARTNOTEFT_GEN_A_CORE
CAPRINI SCORE [CLOT] Score on Admission for     AGE RELATED RISK FACTORS                                                       MOBILITY RELATED FACTORS  [ ] Age 41-60 years                                            (1 Point)                  [ ] Bed rest                                                        (1 Point)  [x ] Age: 61-74 years                                           (2 Points)                 [ ] Plaster cast                                                   (2 Points)  [ ] Age= 75 years                                              (3 Points)                 [ ] Bed bound for more than 72 hours                 (2 Points)    DISEASE RELATED RISK FACTORS                                               GENDER SPECIFIC FACTORS  [ ] Edema in the lower extremities                       (1 Point)                  [ ] Pregnancy                                                     (1 Point)  [ ] Varicose veins                                               (1 Point)                  [ ] Post-partum < 6 weeks                                   (1 Point)             [ ] BMI > 25 Kg/m2                                            (1 Point)                  [ ] Hormonal therapy  or oral contraception          (1 Point)                 [ ] Sepsis (in the previous month)                        (1 Point)                  [ ] History of pregnancy complications                 (1 point)  [ ] Pneumonia or serious lung disease                                               [ ] Unexplained or recurrent                     (1 Point)           (in the previous month)                               (1 Point)  [ ] Abnormal pulmonary function test                     (1 Point)                 SURGERY RELATED RISK FACTORS (include planned surgeries)  [ ] Acute myocardial infarction                              (1 Point)                 [ ]  Section                                             (1 Point)  [ ] Congestive heart failure (in the previous month)  (1 Point)         [ ] Minor surgery                                                  (1 Point)   [ ] Inflammatory bowel disease                             (1 Point)                 [ ] Arthroscopic surgery                                        (2 Points)  [ ] Central venous access                                      (2 Points)                [x ] General surgery lasting more than 45 minutes   (2 Points)       [ ] Stroke (in the previous month)                          (5 Points)               [ ] Elective arthroplasty                                         (5 Points)            [ x] current or past malignancy                              (2 Points)                                                                                                       HEMATOLOGY RELATED FACTORS                                                 TRAUMA RELATED RISK FACTORS  [ ] Prior episodes of VTE                                     (3 Points)                [ ] Fracture of the hip, pelvis, or leg                       (5 Points)  [ ] Positive family history for VTE                         (3 Points)                 [ ] Acute spinal cord injury (in the previous month)  (5 Points)  [ ] Prothrombin 16652 A                                     (3 Points)                 [ ] Paralysis  (less than 1 month)                             (5 Points)  [ ] Factor V Leiden                                             (3 Points)                  [ ] Multiple Trauma within 1 month                        (5 Points)  [ ] Lupus anticoagulants                                     (3 Points)                                                           [ ] Anticardiolipin antibodies                               (3 Points)                                                       [ ] High homocysteine in the blood                      (3 Points)                                             [ ] Other congenital or acquired thrombophilia      (3 Points)                                                [ ] Heparin induced thrombocytopenia                  (3 Points)                                          Total Score [   6       ]    Risk:  Very low 0   Low 1 to 2   Moderate 3 to 4   High =5       VTE Prophylasix Recommednations:  [x ] mechanical pneumatic compression devices                                      [ ] contraindicated: _____________________  [ ] chemo prophylasix                                                                                   [x ] contraindicated ____bleeding risk_________________    **** HIGH LIKELIHOOD DVT PRESENT ON ADMISSION  [x ] (please order LE dopplers within 24 hours of admission)

## 2019-05-28 NOTE — PROGRESS NOTE ADULT - SUBJECTIVE AND OBJECTIVE BOX
72 yo male with PMH of CAD (s/p ENRIQUE x1 in LAD 2006), prostate carcinoma (s/p XRT 2009) s/p cerebral angiogram 12/2018 found to have bilateral supraclinoid internal carotid artery aneurysms -s/p Right  cerebral angiogram with embolization with 3 pipeline stents on 3/11/19.     now s/p Left pipeline stent for LEFT ICA aneurysm on 5/28/19      VITALS:  Recent Vitals Reviewed   LABS:  Recent Labs Reviewed  MEDICATIONS: All Recent Medicaitons Reviewed   IMAGING:   Recent imaging studies were reviewed.    EXAMINATION:  General:  calm  HEENT:  MMM  Neuro:  awake, alert, oriented x 3, follows commands,  Pupils Equal and reactive, RUE 5/5,  LUE 5/5,  RLE 5/5,  LLE 5/5, no drift   Cards:  s1, s2 RRR  Respiratory:  lungs clear b/l   [place]:  soft  Extremities:  no edema  Skin:  warm/dry    Estella Chao

## 2019-05-28 NOTE — CHART NOTE - NSCHARTNOTEFT_GEN_A_CORE
Interventional Neuro Radiology  Pre-Procedure Note PA-C    This is a 73y ____ hand dominant Male      HPI:  72 yo male with PMH of CAD (s/p ENRIQUE x1 in LAD 2006), prostate carcinoma (s/p XRT 2009) s/p cerebral angiogram 12/2018 found to have bilateral supraclinoid internal carotid artery aneurysms -s/p Right  cerebral angiogram with embolization with 3 pipeline stents on 3/11/19. Now coming in for Left pipeline embo for LEFT ICA on 5/28/19. (21 May 2019 07:33)      Allergies: No Known Allergies  Zithromax (Other)      PAST MEDICAL & SURGICAL HISTORY:  Cerebral aneurysm: bilateral  Dyslipidemia  Prostate cancer: s/p XRT 2009  CAD (coronary artery disease): denies MI  S/P coil embolization of cerebral aneurysm: right 3/2019  S/P appendectomy  S/P drug eluting coronary stent placement: LAD 2006  H/O hernia repair: x3  1980, 1983, 2002 with mesh  left inguinal      Social History:   FAMILY HISTORY:  Current Medications:     Complete Blood Count (05.21.19 )    WBC Count: 6.27 K/uL    Hemoglobin: 14.2 g/dL    Hematocrit: 44.3 %    Platelet Count  191 K/uL                    Blood Bank:       Assessment/Plan:   This is a 73 year old right  hand dominant Male  presents with   Patient presents to neuro-IR for selective cerebral angiography.   Procedure, goals, risks, benefits and alternatives  were discussed with patient and patient's family.  All questions were answered.  Risks include but are not limited to stroke, vessel injury, hemorrhage, and or right  groin hematoma.  Patient demonstrates understanding  of all risks involved with this procedure and wishes to continue.   Appropriate  content was obtained from patient and consent is in the patient's chart. Interventional Neuro Radiology  Pre-Procedure Note PA-C    This is a 73 year old right hand male with a past medical history of CAD (s/p ENRIQUE x1 in LAD 2006), prostate carcinoma (s/p XRT 2009) s/p cerebral angiogram 12/2018 found to have bilateral supraclinoid internal carotid artery aneurysms -s/p Right  cerebral angiogram with embolization with 3 pipeline stents on 3/11/19. Patient now presents to Neuro IR for endovascular treatment of a LEFT ICA aneurysm.     Allergies: No Known Allergies Zithromax (Other)  PMHX: bilateral ICA aneurysm, dyslipidemia, prostate cancer, coronary artery disease  PSHX: coil embolization of cerebral aneurysm: right ICA aneurysm 3/2019, appendectomy, coronary stent placement: LAD 2006  hernia repair: x3  1980, 1983, 2002 with mesh left inguinal  Social History: former smoker quit 30 years ago  FAMILY HISTORY: non-contributory   Current Medications: ASA 325mg and Plavix 75mg    Complete Blood Count (05.21.19 )    WBC Count: 6.27 K/uL    Hemoglobin: 14.2 g/dL    Hematocrit: 44.3 %    Platelet Count  191 K/uL    Basic Metabolic Panel (05.21.19 @ 08:55)    Sodium, Serum: 140 mmol/L    Potassium, Serum: 4.2 mmol/L    Chloride, Serum: 103 mmol/L    Carbon Dioxide, Serum: 24 mmol/L    Anion Gap, Serum: 13 mmol/L    Blood Urea Nitrogen, Serum: 19 mg/dL    Creatinine, Serum: 0.65 mg/dL    Glucose, Serum: 107 mg/dL    Calcium, Total Serum: 9.3 mg/dL    Blood Bank:  A positive available  PRU:             51      Assessment/Plan:   This is a 73 year old right hand dominant male with bilateral ICA aneurysms status post endovascular treatment of a right ICA aneurysm 3-2019 and today presents to Neuro IR for endovascular treatment of left ICA aneurysm. Procedure, goals, risks, benefits and alternatives were discussed with patient and patient's wife. All questions were answered. Risks include but are not limited to stroke, vessel injury, hemorrhage, and or right  groin hematoma. Patient demonstrates understanding of all risks involved with this procedure and wishes to continue. Appropriate content was obtained from patient and consent is in the patient's chart.

## 2019-05-29 ENCOUNTER — TRANSCRIPTION ENCOUNTER (OUTPATIENT)
Age: 74
End: 2019-05-29

## 2019-05-29 VITALS
RESPIRATION RATE: 16 BRPM | DIASTOLIC BLOOD PRESSURE: 62 MMHG | SYSTOLIC BLOOD PRESSURE: 125 MMHG | TEMPERATURE: 98 F | HEART RATE: 51 BPM | OXYGEN SATURATION: 100 %

## 2019-05-29 LAB
ANION GAP SERPL CALC-SCNC: 12 MMOL/L — SIGNIFICANT CHANGE UP (ref 5–17)
BUN SERPL-MCNC: 10 MG/DL — SIGNIFICANT CHANGE UP (ref 7–23)
CALCIUM SERPL-MCNC: 8.8 MG/DL — SIGNIFICANT CHANGE UP (ref 8.4–10.5)
CHLORIDE SERPL-SCNC: 104 MMOL/L — SIGNIFICANT CHANGE UP (ref 96–108)
CO2 SERPL-SCNC: 25 MMOL/L — SIGNIFICANT CHANGE UP (ref 22–31)
CREAT SERPL-MCNC: 0.65 MG/DL — SIGNIFICANT CHANGE UP (ref 0.5–1.3)
GLUCOSE SERPL-MCNC: 141 MG/DL — HIGH (ref 70–99)
HBA1C BLD-MCNC: 5.3 % — SIGNIFICANT CHANGE UP (ref 4–5.6)
PA ADP PRP-ACNC: 7 PRU — LOW (ref 194–417)
POTASSIUM SERPL-MCNC: 4.5 MMOL/L — SIGNIFICANT CHANGE UP (ref 3.5–5.3)
POTASSIUM SERPL-SCNC: 4.5 MMOL/L — SIGNIFICANT CHANGE UP (ref 3.5–5.3)
SODIUM SERPL-SCNC: 141 MMOL/L — SIGNIFICANT CHANGE UP (ref 135–145)

## 2019-05-29 PROCEDURE — 70544 MR ANGIOGRAPHY HEAD W/O DYE: CPT | Mod: 26,59

## 2019-05-29 PROCEDURE — 61624 TCAT PERM OCCLS/EMBOLJ CNS: CPT

## 2019-05-29 PROCEDURE — 85576 BLOOD PLATELET AGGREGATION: CPT

## 2019-05-29 PROCEDURE — 75898 FOLLOW-UP ANGIOGRAPHY: CPT

## 2019-05-29 PROCEDURE — C1889: CPT

## 2019-05-29 PROCEDURE — 80048 BASIC METABOLIC PNL TOTAL CA: CPT

## 2019-05-29 PROCEDURE — C1769: CPT

## 2019-05-29 PROCEDURE — 84100 ASSAY OF PHOSPHORUS: CPT

## 2019-05-29 PROCEDURE — 70551 MRI BRAIN STEM W/O DYE: CPT

## 2019-05-29 PROCEDURE — C1894: CPT

## 2019-05-29 PROCEDURE — C1760: CPT

## 2019-05-29 PROCEDURE — 70544 MR ANGIOGRAPHY HEAD W/O DYE: CPT

## 2019-05-29 PROCEDURE — C1887: CPT

## 2019-05-29 PROCEDURE — 85027 COMPLETE CBC AUTOMATED: CPT

## 2019-05-29 PROCEDURE — 36224 PLACE CATH CAROTD ART: CPT

## 2019-05-29 PROCEDURE — 83036 HEMOGLOBIN GLYCOSYLATED A1C: CPT

## 2019-05-29 PROCEDURE — 36228 PLACE CATH INTRACRANIAL ART: CPT

## 2019-05-29 PROCEDURE — 70551 MRI BRAIN STEM W/O DYE: CPT | Mod: 26

## 2019-05-29 PROCEDURE — 99233 SBSQ HOSP IP/OBS HIGH 50: CPT

## 2019-05-29 PROCEDURE — 83735 ASSAY OF MAGNESIUM: CPT

## 2019-05-29 PROCEDURE — 76380 CAT SCAN FOLLOW-UP STUDY: CPT

## 2019-05-29 PROCEDURE — 75894 X-RAYS TRANSCATH THERAPY: CPT

## 2019-05-29 RX ORDER — ENOXAPARIN SODIUM 100 MG/ML
40 INJECTION SUBCUTANEOUS
Refills: 0 | Status: DISCONTINUED | OUTPATIENT
Start: 2019-05-29 | End: 2019-05-29

## 2019-05-29 RX ORDER — POTASSIUM CHLORIDE 20 MEQ
40 PACKET (EA) ORAL ONCE
Refills: 0 | Status: COMPLETED | OUTPATIENT
Start: 2019-05-29 | End: 2019-05-29

## 2019-05-29 RX ORDER — CALCIUM GLUCONATE 100 MG/ML
1 VIAL (ML) INTRAVENOUS ONCE
Refills: 0 | Status: COMPLETED | OUTPATIENT
Start: 2019-05-29 | End: 2019-05-29

## 2019-05-29 RX ADMIN — CLOPIDOGREL BISULFATE 75 MILLIGRAM(S): 75 TABLET, FILM COATED ORAL at 08:13

## 2019-05-29 RX ADMIN — Medication 200 GRAM(S): at 05:47

## 2019-05-29 RX ADMIN — Medication 40 MILLIEQUIVALENT(S): at 05:47

## 2019-05-29 RX ADMIN — Medication 325 MILLIGRAM(S): at 08:13

## 2019-05-29 NOTE — PROGRESS NOTE ADULT - SUBJECTIVE AND OBJECTIVE BOX
72 yo male with PMH of CAD (s/p ENRIQUE x1 in LAD 2006), prostate carcinoma (s/p XRT 2009) s/p cerebral angiogram 12/2018 found to have bilateral supraclinoid internal carotid artery aneurysms -s/p Right  cerebral angiogram with embolization with 3 pipeline stents on 3/11/19.     s/p Left pipeline stent for LEFT ICA aneurysm on 5/28/19    REVIEW OF SYSTEMS:    [X ] All ROS addressed below are non-contributory, except:  Neuro: [X ] Mild Headache [ ] Back pain [ ] Numbness [ ] Weakness [ ] Ataxia [ ] Dizziness [ ] Aphasia [ ] Dysarthria [ ] Visual disturbance  Resp: [ ] Shortness of breath/dyspnea, [ ] Orthopnea [ ] Cough  CV: [ ] Chest pain [ ] Palpitation [ ] Lightheadedness [ ] Syncope  Renal: [ ] Thirst [ ] Edema  GI: [ ] Nausea [ ] Emesis [ ] Abdominal pain [ ] Constipation [ ] Diarrhea  Hem: [ ] Hematemesis [ ] bright red blood per rectum  ID: [ ] Fever [ ] Chills [ ] Dysuria  ENT: [ ] Rhinorrhea    MEDICATIONS  (STANDING):  aspirin 325 milliGRAM(s) Oral daily  clopidogrel Tablet 75 milliGRAM(s) Oral daily  metoprolol tartrate 25 milliGRAM(s) Oral two times a day  simvastatin 20 milliGRAM(s) Oral at bedtim    MEDICATIONS  (PRN):    ICU Vital Signs Last 24 Hrs  T(C): 36.5 (29 May 2019 05:00), Max: 36.5 (29 May 2019 05:00)  HR: 55 (29 May 2019 08:00) (48 - 64)  BP: 125/58 (29 May 2019 08:00) (92/52 - 125/58)  RR: 15 (29 May 2019 08:00) (14 - 16)  SpO2: 100% (29 May 2019 08:00) (94% - 100%)                          13.7   5.5   )-----------( 180      ( 28 May 2019 22:31 )             40.0   05-28    137  |  106  |  12  ----------------------------<  215<H>  3.6   |  20<L>  |  0.53    Ca    8.1<L>      28 May 2019 22:31  Phos  3.9     05-28  Mg     1.9     05-28    CAPILLARY BLOOD GLUCOSE   MR Head No Cont (03.12.19 @ 14:34) >  Right-sided supraclinoid pipeline stent. Several punctate   acute infarcts in the right frontal parietal cortex. No hemorrhage. Good   intracranial flow on noninvasive flow MR angiography      EXAMINATION:  General:  calm  HEENT:  MMM  Neuro:  awake, alert, oriented x 3, follows commands,  Pupils Equal and reactive, RUE 5/5,  LUE 5/5,  RLE 5/5,  LLE 5/5, no drift   Cards:  s1, s2 RRR  Respiratory:  lungs clear b/l   Abdomen:  soft and non tender pos BS  Extremities:  no edema, Pulses 2 + DP/PT, No cyanosis, feet warm   Skin:  warm/dry/ R groin no hematoma or bleeding

## 2019-05-29 NOTE — DISCHARGE NOTE PROVIDER - HOSPITAL COURSE
This is a 73 year old right hand male with a past medical history of CAD (s/p ENRIQUE x1 in LAD 2006), prostate carcinoma (s/p XRT 2009) s/p cerebral angiogram 12/2018 found to have bilateral supraclinoid internal carotid artery aneurysms -s/p Right  cerebral angiogram with embolization with 3 pipeline stents on 3/11/19. Patient now presents to Neuro IR for endovascular treatment of a LEFT ICA aneurysm. This hospital admission: s/p  pipeline stent to [facility]. MRI MRA NOVA post-procedure appears normal. Patient stable for discharge.

## 2019-05-29 NOTE — DISCHARGE NOTE NURSING/CASE MANAGEMENT/SOCIAL WORK - NSDCDPATPORTLINK_GEN_ALL_CORE
You can access the [facility] Patient Portal, offered by [facility], by registering with the following website: [email or web address]

## 2019-05-29 NOTE — DISCHARGE NOTE PROVIDER - NSDCCPCAREPLAN_GEN_ALL_CORE_FT
PRINCIPAL DISCHARGE DIAGNOSIS  Diagnosis: H/O cerebral aneurysm repair  Assessment and Plan of Treatment:

## 2019-05-29 NOTE — PROGRESS NOTE ADULT - ASSESSMENT
ASSESSMENT/PLAN:  s/p Left pipeline stent for LEFT ICA aneurysm POD #1    NEURO:    Neurochecks q4 hrs,  MR NOVA at 1100 today   Pain control  ASA/Plavix daily   check P2y12   Activity: [X] mobilize as tolerated     PULM:  O2 sat > 92%  Incentive spirometer    CV:  SBP goal 100 -< 150  HTN: c/w  metoprolol  HLD: c/w Zocor     RENAL:  D/C downs  IVL     GI:    Diet: advance diet as tolerated   GI prophylaxis [X] not indicated   Bowel regimen [x] colace [x] senna [] other:    ENDO: Hyperglycemia X1  Repeat BMP and check HGBA1C  Goal euglycemia (-180)    HEME/ONC:  VTE prophylaxis: [X] SCDs [X] chemoprophylaxis - Lovenox    ID:  afebrile     SOCIAL/FAMILY:   [X] updated at bedside     CODE STATUS:  [x] Full Code     DISPOSITION:   [X] Floor or home     [x] Patient is not critically ill yet medically complex  Time spent: 35 critical care minutes    Contact: [phone]

## 2019-05-29 NOTE — DISCHARGE NOTE PROVIDER - CARE PROVIDER_API CALL
Yves Martinez)  Neurological Surgery  [address] [place] [address]  Phone: [phone]  Fax: [phone]  Follow Up Time:

## 2019-05-29 NOTE — PROGRESS NOTE ADULT - SUBJECTIVE AND OBJECTIVE BOX
Vital Signs Last 24 Hrs  T(C): 36.2 (28 May 2019 20:00), Max: 36.2 (28 May 2019 15:30)  T(F): 97.2 (28 May 2019 20:00), Max: 97.2 (28 May 2019 15:30)  HR: 51 (28 May 2019 23:00) (51 - 64)  BP: 92/52 (28 May 2019 23:00) (92/52 - 118/58)  BP(mean): 66 (28 May 2019 23:00) (66 - 82)  RR: 14 (28 May 2019 23:00) (14 - 16)  SpO2: 96% (28 May 2019 23:00) (96% - 100%)    EXAM  AOx3, FC, PERRL, EOMI, no facial   5/5 throughout, no drift

## 2019-05-31 ENCOUNTER — EMERGENCY (EMERGENCY)
Facility: HOSPITAL | Age: 74
LOS: 1 days | Discharge: HOME | End: 2019-05-31
Attending: EMERGENCY MEDICINE | Admitting: EMERGENCY MEDICINE
Payer: MEDICARE

## 2019-05-31 VITALS
HEART RATE: 56 BPM | RESPIRATION RATE: 14 BRPM | WEIGHT: 158.07 LBS | SYSTOLIC BLOOD PRESSURE: 92 MMHG | HEIGHT: 71 IN | DIASTOLIC BLOOD PRESSURE: 60 MMHG | OXYGEN SATURATION: 98 % | TEMPERATURE: 98 F

## 2019-05-31 VITALS
DIASTOLIC BLOOD PRESSURE: 66 MMHG | TEMPERATURE: 98 F | SYSTOLIC BLOOD PRESSURE: 111 MMHG | HEART RATE: 58 BPM | RESPIRATION RATE: 18 BRPM | OXYGEN SATURATION: 98 %

## 2019-05-31 DIAGNOSIS — Z95.5 PRESENCE OF CORONARY ANGIOPLASTY IMPLANT AND GRAFT: Chronic | ICD-10-CM

## 2019-05-31 DIAGNOSIS — Z98.890 OTHER SPECIFIED POSTPROCEDURAL STATES: Chronic | ICD-10-CM

## 2019-05-31 DIAGNOSIS — Z90.49 ACQUIRED ABSENCE OF OTHER SPECIFIED PARTS OF DIGESTIVE TRACT: Chronic | ICD-10-CM

## 2019-05-31 LAB
ALBUMIN SERPL ELPH-MCNC: 3.8 G/DL — SIGNIFICANT CHANGE UP (ref 3.3–5)
ALP SERPL-CCNC: 65 U/L — SIGNIFICANT CHANGE UP (ref 40–120)
ALT FLD-CCNC: 28 U/L — SIGNIFICANT CHANGE UP (ref 12–78)
ANION GAP SERPL CALC-SCNC: 6 MMOL/L — SIGNIFICANT CHANGE UP (ref 5–17)
APPEARANCE UR: CLEAR — SIGNIFICANT CHANGE UP
AST SERPL-CCNC: 20 U/L — SIGNIFICANT CHANGE UP (ref 15–37)
BACTERIA # UR AUTO: ABNORMAL
BILIRUB SERPL-MCNC: 0.6 MG/DL — SIGNIFICANT CHANGE UP (ref 0.2–1.2)
BILIRUB UR-MCNC: NEGATIVE — SIGNIFICANT CHANGE UP
BUN SERPL-MCNC: 14 MG/DL — SIGNIFICANT CHANGE UP (ref 7–23)
CALCIUM SERPL-MCNC: 8.6 MG/DL — SIGNIFICANT CHANGE UP (ref 8.5–10.1)
CHLORIDE SERPL-SCNC: 105 MMOL/L — SIGNIFICANT CHANGE UP (ref 96–108)
CK SERPL-CCNC: 46 U/L — SIGNIFICANT CHANGE UP (ref 26–308)
CO2 SERPL-SCNC: 29 MMOL/L — SIGNIFICANT CHANGE UP (ref 22–31)
COLOR SPEC: YELLOW — SIGNIFICANT CHANGE UP
CREAT SERPL-MCNC: 0.83 MG/DL — SIGNIFICANT CHANGE UP (ref 0.5–1.3)
DIFF PNL FLD: ABNORMAL
EPI CELLS # UR: SIGNIFICANT CHANGE UP
GLUCOSE SERPL-MCNC: 100 MG/DL — HIGH (ref 70–99)
GLUCOSE UR QL: NEGATIVE — SIGNIFICANT CHANGE UP
HCT VFR BLD CALC: 43.7 % — SIGNIFICANT CHANGE UP (ref 39–50)
HGB BLD-MCNC: 14.4 G/DL — SIGNIFICANT CHANGE UP (ref 13–17)
KETONES UR-MCNC: ABNORMAL
LACTATE SERPL-SCNC: 1.7 MMOL/L — SIGNIFICANT CHANGE UP (ref 0.7–2)
LEUKOCYTE ESTERASE UR-ACNC: NEGATIVE — SIGNIFICANT CHANGE UP
MAGNESIUM SERPL-MCNC: 2.2 MG/DL — SIGNIFICANT CHANGE UP (ref 1.6–2.6)
MCHC RBC-ENTMCNC: 28.2 PG — SIGNIFICANT CHANGE UP (ref 27–34)
MCHC RBC-ENTMCNC: 33 GM/DL — SIGNIFICANT CHANGE UP (ref 32–36)
MCV RBC AUTO: 85.5 FL — SIGNIFICANT CHANGE UP (ref 80–100)
NITRITE UR-MCNC: NEGATIVE — SIGNIFICANT CHANGE UP
NRBC # BLD: 0 /100 WBCS — SIGNIFICANT CHANGE UP (ref 0–0)
PH UR: 7 — SIGNIFICANT CHANGE UP (ref 5–8)
PLATELET # BLD AUTO: 186 K/UL — SIGNIFICANT CHANGE UP (ref 150–400)
POTASSIUM SERPL-MCNC: 4 MMOL/L — SIGNIFICANT CHANGE UP (ref 3.5–5.3)
POTASSIUM SERPL-SCNC: 4 MMOL/L — SIGNIFICANT CHANGE UP (ref 3.5–5.3)
PROT SERPL-MCNC: 7.2 G/DL — SIGNIFICANT CHANGE UP (ref 6–8.3)
PROT UR-MCNC: 25 MG/DL
RBC # BLD: 5.11 M/UL — SIGNIFICANT CHANGE UP (ref 4.2–5.8)
RBC # FLD: 13.2 % — SIGNIFICANT CHANGE UP (ref 10.3–14.5)
RBC CASTS # UR COMP ASSIST: SIGNIFICANT CHANGE UP /HPF (ref 0–4)
SODIUM SERPL-SCNC: 140 MMOL/L — SIGNIFICANT CHANGE UP (ref 135–145)
SP GR SPEC: 1 — LOW (ref 1.01–1.02)
TROPONIN I SERPL-MCNC: <.015 NG/ML — SIGNIFICANT CHANGE UP (ref 0.01–0.04)
TSH SERPL-MCNC: 0.42 UIU/ML — SIGNIFICANT CHANGE UP (ref 0.36–3.74)
UROBILINOGEN FLD QL: NEGATIVE — SIGNIFICANT CHANGE UP
WBC # BLD: 8.85 K/UL — SIGNIFICANT CHANGE UP (ref 3.8–10.5)
WBC # FLD AUTO: 8.85 K/UL — SIGNIFICANT CHANGE UP (ref 3.8–10.5)
WBC UR QL: SIGNIFICANT CHANGE UP

## 2019-05-31 PROCEDURE — 80053 COMPREHEN METABOLIC PANEL: CPT

## 2019-05-31 PROCEDURE — 84443 ASSAY THYROID STIM HORMONE: CPT

## 2019-05-31 PROCEDURE — 87086 URINE CULTURE/COLONY COUNT: CPT

## 2019-05-31 PROCEDURE — 71275 CT ANGIOGRAPHY CHEST: CPT | Mod: 26

## 2019-05-31 PROCEDURE — 93005 ELECTROCARDIOGRAM TRACING: CPT

## 2019-05-31 PROCEDURE — 99285 EMERGENCY DEPT VISIT HI MDM: CPT

## 2019-05-31 PROCEDURE — 84484 ASSAY OF TROPONIN QUANT: CPT

## 2019-05-31 PROCEDURE — 71275 CT ANGIOGRAPHY CHEST: CPT

## 2019-05-31 PROCEDURE — 99284 EMERGENCY DEPT VISIT MOD MDM: CPT | Mod: 25

## 2019-05-31 PROCEDURE — 81001 URINALYSIS AUTO W/SCOPE: CPT

## 2019-05-31 PROCEDURE — 80061 LIPID PANEL: CPT

## 2019-05-31 PROCEDURE — 82550 ASSAY OF CK (CPK): CPT

## 2019-05-31 PROCEDURE — 71046 X-RAY EXAM CHEST 2 VIEWS: CPT

## 2019-05-31 PROCEDURE — 36415 COLL VENOUS BLD VENIPUNCTURE: CPT

## 2019-05-31 PROCEDURE — 93010 ELECTROCARDIOGRAM REPORT: CPT

## 2019-05-31 PROCEDURE — 96366 THER/PROPH/DIAG IV INF ADDON: CPT

## 2019-05-31 PROCEDURE — 85027 COMPLETE CBC AUTOMATED: CPT

## 2019-05-31 PROCEDURE — 83605 ASSAY OF LACTIC ACID: CPT

## 2019-05-31 PROCEDURE — 96365 THER/PROPH/DIAG IV INF INIT: CPT | Mod: XU

## 2019-05-31 PROCEDURE — 71046 X-RAY EXAM CHEST 2 VIEWS: CPT | Mod: 26

## 2019-05-31 PROCEDURE — 83735 ASSAY OF MAGNESIUM: CPT

## 2019-05-31 PROCEDURE — 87040 BLOOD CULTURE FOR BACTERIA: CPT

## 2019-05-31 RX ORDER — SODIUM CHLORIDE 9 MG/ML
1000 INJECTION, SOLUTION INTRAVENOUS
Refills: 0 | Status: DISCONTINUED | OUTPATIENT
Start: 2019-05-31 | End: 2019-06-04

## 2019-05-31 RX ADMIN — SODIUM CHLORIDE 1000 MILLILITER(S): 9 INJECTION, SOLUTION INTRAVENOUS at 16:40

## 2019-05-31 RX ADMIN — SODIUM CHLORIDE 500 MILLILITER(S): 9 INJECTION, SOLUTION INTRAVENOUS at 13:40

## 2019-05-31 NOTE — ED ADULT NURSE NOTE - ED STAT RN HANDOFF DETAILS
Assumed care of pt from previous nurse, Carly HYDE in rm T3B, a/o x4, skin warm and dry, + sensation, + capillary refill <2  sec, all pulses are palpables, Assumed care of pt from previous nurse, Carly RN in rm T3B, a/o x4, skin warm and dry, + sensation, + capillary refill <2  sec, all pulses are palpable, No respiratory distress noted, even and unlabored breathing, abd softy BS+ all 4 quads, nontender. IV access noted on the left a/c patent/ intact, NS infusing..

## 2019-05-31 NOTE — ED ADULT NURSE REASSESSMENT NOTE - NS ED NURSE REASSESS COMMENT FT1
Pt remains awake and alert denies any pain seen by DR Ruff, pt given food, and fluids connie well pt to have repeat labs and have bp recheck in 2 hours then to call DR Ruff with results

## 2019-05-31 NOTE — ED PROVIDER NOTE - CLINICAL SUMMARY MEDICAL DECISION MAKING FREE TEXT BOX
gen weakness today , slight cp/sob, near syncopal episode - check labs, XR, CTA (recent hosp), cardio

## 2019-05-31 NOTE — ED PROVIDER NOTE - PROGRESS NOTE DETAILS
Pt seen by Dr LILLIAN Ruff, ok to dc home after 2nd set CE At length discussion with patient regarding nature of the weakness / chest pain. Discussed results of all diagnostic testing in ED. Discussed chest pain precautions and instructions. Patient demonstrates understanding that a cardiac cause of the chest pain has not been totally excluded, but at this time there is no evidence to warrant an inpatient workup.  Discussed the importance of continued follow-up with Cardiology as outpatient to complete cardiac workup.

## 2019-05-31 NOTE — ED ADULT NURSE NOTE - OBJECTIVE STATEMENT
Pt awake and alert from home recently had anuresym repair then this a, he felt diaphoretic pt denies any pain pt noted to have hypotension

## 2019-05-31 NOTE — CONSULT NOTE ADULT - SUBJECTIVE AND OBJECTIVE BOX
[facility]     Patient is a 73y old  Male who presents with a chief complaint of     HPI:      HPI:    73y Male for Cardiology Consult    PAST MEDICAL & SURGICAL HISTORY:  Brain aneurysm  HTN (hypertension)  HLD (hyperlipidemia)      FAMILY HISTORY:      SOCIAL HISTORY:   Alcohol:  Smoking:    Allergies    Zithromax (Unknown)    Intolerances        MEDICATIONS  (STANDING):    MEDICATIONS  (PRN):      REVIEW OF SYSTEMS:  CONSTITUTIONAL: No fever, weight loss, chills, shakes, or fat  RESPIRATORY: No cough, wheezing, hemoptysis, or shortness of breath  CARDIOVASCULAR: No chest pain, dyspnea, palpitations, dizziness, syncope, paroxysmal nocturnal dyspnea, orthopnea, or arm or leg swelling  GASTROINTESTINAL: No abdominal  or epigastric pain, nausea, vomiting, hematemesis, diarrhea, constipation, melena or bright red bloo  NEUROLOGICAL: No headaches, memory loss, slurred speech, limb weakness, loss of strength, numbness, or tremors  SKIN: No itching, burning, rashes, or lesions  ENDOCRINE: No heat or cold intolerance, or hair loss  MUSCULOSKELETAL: No joint pain or swelling, muscle, back, or extremity pain  HEME/LYMPH: No easy bruising or bleeding gums  ALLERY AND IMMUNOLOGIC: No hives or rash.    Vital Signs Last 24 Hrs  T(C): 36.9 (31 May 2019 09:55), Max: 36.9 (31 May 2019 09:55)  T(F): 98.5 (31 May 2019 09:55), Max: 98.5 (31 May 2019 09:55)  HR: 51 (31 May 2019 09:55) (51 - 56)  BP: 91/56 (31 May 2019 09:55) (91/56 - 92/60)  BP(mean): --  RR: 16 (31 May 2019 09:55) (14 - 16)  SpO2: 93% (31 May 2019 09:55) (93% - 98%)    PHYSICAL EXAM:  HEAD:  Atraumatic, Normocephalic  EYES: EOMI, PERRLA, conjunctiva and sclera clear  NECK: Supple and normal thyroid.  No JVD or carotid bruit.   HEART: S1, S2 regular , 1/6 soft ejection systolic murmur in mitral area , no thrill and no gallops .  PULMONARY: Bilateral vesicular breathing , few scattered ronchi and few scattered rales are present .  ABDOMEN: Soft nontender and positive bowl sounds   EXTREMITIES:  No clubbing, cyanosis, or pedal  edema  NEUROLOGICAL: AAOX3 , no focal deficit .    LABS:                        14.4   8.85  )-----------( 186      ( 31 May 2019 09:45 )             43.7     05-31    140  |  105  |  14  ----------------------------<  100<H>  4.0   |  29  |  0.83    Ca    8.6      31 May 2019 09:45    TPro  7.2  /  Alb  3.8  /  TBili  0.6  /  DBili  x   /  AST  20  /  ALT  28  /  AlkPhos  65  05-31    CARDIAC MARKERS ( 31 May 2019 09:45 )  <.015 ng/mL / x     / 46 U/L / x     / x              BNP      EKG:  ECHO:  IMAGING:    Assessment and Plan :     Will continue to follow during hospital course and give further recommendations as needed . Thanks for your referral . if any questions please contact me at office ([phone])cell [phone]) [facility]     Patient is a 73y old  Male who presents with a chief complaint of     HPI:      HPI:    73y Male for Cardiology Consult    PAST MEDICAL & SURGICAL HISTORY:  Brain aneurysm  HTN (hypertension)  HLD (hyperlipidemia)      FAMILY HISTORY:      SOCIAL HISTORY:   Alcohol:  Smoking:    Allergies    Zithromax (Unknown)    Intolerances        MEDICATIONS  (STANDING):    MEDICATIONS  (PRN):    Vital Signs Last 24 Hrs  T(C): 36.9 (31 May 2019 09:55), Max: 36.9 (31 May 2019 09:55)  T(F): 98.5 (31 May 2019 09:55), Max: 98.5 (31 May 2019 09:55)  HR: 51 (31 May 2019 09:55) (51 - 56)  BP: 91/56 (31 May 2019 09:55) (91/56 - 92/60)  BP(mean): --  RR: 16 (31 May 2019 09:55) (14 - 16)  SpO2: 93% (31 May 2019 09:55) (93% - 98%)                        14.4   8.85  )-----------( 186      ( 31 May 2019 09:45 )             43.7     05-31    140  |  105  |  14  ----------------------------<  100<H>  4.0   |  29  |  0.83    Ca    8.6      31 May 2019 09:45    TPro  7.2  /  Alb  3.8  /  TBili  0.6  /  DBili  x   /  AST  20  /  ALT  28  /  AlkPhos  65  05-31    CARDIAC MARKERS ( 31 May 2019 09:45 )  <.015 ng/mL / x     / 46 U/L / x     / x            Assessment and Plan :   FULL CONSULT DICTATED .  73 years old male with H/O CAD and stent done many years ago and recently has done clipping of a small aneurysm in brain . Patient has sharp chest pain and atypical in nature and also felt dizzy and systolic BP lower limit of normal . Decrease metoprolol XL 12.5 mg daily and discharge home if patient asymptomatic and two sets of Troponin I negative . Cardiology F/U 1-2 weeks as out patient with Dr Sharif  Will continue to follow during hospital course and give further recommendations as needed . Thanks for your referral . if any questions please contact me at office ([phone])cell [phone])

## 2019-05-31 NOTE — ED PROVIDER NOTE - CARE PROVIDER_API CALL
Dario Sharif)  Cardiology; Internal Medicine; Nuclear Cardiology  [address] [place] [address] [place] [address]  Phone: [phone]  Fax: [phone]  Follow Up Time:

## 2019-06-01 LAB
CHOLEST SERPL-MCNC: 142 MG/DL — SIGNIFICANT CHANGE UP (ref 10–199)
CULTURE RESULTS: NO GROWTH — SIGNIFICANT CHANGE UP
HDLC SERPL-MCNC: 51 MG/DL — SIGNIFICANT CHANGE UP
LIPID PNL WITH DIRECT LDL SERPL: 72 MG/DL — SIGNIFICANT CHANGE UP
SPECIMEN SOURCE: SIGNIFICANT CHANGE UP
TOTAL CHOLESTEROL/HDL RATIO MEASUREMENT: 2.8 RATIO — LOW (ref 3.4–9.6)
TRIGL SERPL-MCNC: 93 MG/DL — SIGNIFICANT CHANGE UP (ref 10–149)

## 2019-06-03 PROBLEM — I67.1 CEREBRAL ANEURYSM, NONRUPTURED: Chronic | Status: ACTIVE | Noted: 2019-05-31

## 2019-06-03 PROBLEM — I10 ESSENTIAL (PRIMARY) HYPERTENSION: Chronic | Status: ACTIVE | Noted: 2019-05-31

## 2019-06-03 PROBLEM — E78.5 HYPERLIPIDEMIA, UNSPECIFIED: Chronic | Status: ACTIVE | Noted: 2019-05-31

## 2019-06-05 ENCOUNTER — APPOINTMENT (OUTPATIENT)
Dept: NEUROSURGERY | Facility: CLINIC | Age: 74
End: 2019-06-05
Payer: MEDICARE

## 2019-06-05 VITALS
BODY MASS INDEX: 21.84 KG/M2 | DIASTOLIC BLOOD PRESSURE: 68 MMHG | TEMPERATURE: 98 F | SYSTOLIC BLOOD PRESSURE: 109 MMHG | HEIGHT: 71 IN | OXYGEN SATURATION: 98 % | HEART RATE: 55 BPM | RESPIRATION RATE: 16 BRPM | WEIGHT: 156 LBS

## 2019-06-05 LAB
CULTURE RESULTS: SIGNIFICANT CHANGE UP
CULTURE RESULTS: SIGNIFICANT CHANGE UP
SPECIMEN SOURCE: SIGNIFICANT CHANGE UP
SPECIMEN SOURCE: SIGNIFICANT CHANGE UP

## 2019-06-05 PROCEDURE — 99214 OFFICE O/P EST MOD 30 MIN: CPT

## 2019-06-05 RX ORDER — ASPIRIN 325 MG/1
325 TABLET, FILM COATED ORAL
Refills: 0 | Status: ACTIVE | COMMUNITY

## 2019-06-05 RX ORDER — CLOPIDOGREL 75 MG/1
75 TABLET, FILM COATED ORAL
Refills: 0 | Status: ACTIVE | COMMUNITY

## 2019-06-05 NOTE — ASSESSMENT
[FreeTextEntry1] : Impression: 73yr old male s/p LICA aneurysm embolization with pipeline flow diversion 5/28/2019; CRISTINA aneurysm embolized with pipeline flow diversion 3/11/2019 \par \par Plan: \par Reviewed patients MRA with NOVA post pipeline which demonstrates good intracranial blood flow, no in stent stenosis\par Continue Aspirin 325mg daily \par Continue Plavix 75mg daily (was previously on plavix prior to our procedure) PRU= 7 from 5/29/2019\par MRA brain non con with NOVA September 2019 then follow up in the office after \par Cerebral Angiogram December 2019

## 2019-06-05 NOTE — REASON FOR VISIT
[Follow-Up: _____] : a [unfilled] follow-up visit [Spouse] : spouse [FreeTextEntry1] : Prema Johnson is a 73yr old right handed male here for a follow up visit.  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 He tolerated the procedure well, recovered in the PACU and was discharged home in stable condition after having hi MRA brain with NOVA completed. After he was home he started feeling chills and described a near syncopal episode (denies loss of consciousness). HE went to [facility] and work up found he was dehydrated, he was given fluids and discharged home in stable condition. Since then he has been feeling well. He denies any motor, sensory, speech, visual abnormalities. His right groin is well healed no signs or symptoms of infection. \par \par \par HPI: 73 y.o male with PMHx of CAD (s/p ENRIQUE x1 in LAD 2006), prostate carcinoma (s/p \par XRT 2009) s/p cerebral angiogram 12/2018 found to have bilateral supraclinoid \par internal carotid artery aneurysms.

## 2019-06-05 NOTE — RESULTS/DATA
[FreeTextEntry1] : EXAM: MR ANGIO BRAIN \par \par EXAM: MR BRAIN \par \par \par PROCEDURE DATE: 05/29/2019 \par \par \par \par \par INTERPRETATION: Noncontrast MRI and MRA of the brain. \par \par CLINICAL INDICATION: Status post placement of left ICA pipeline stent. \par Previous placement of right ICA pipeline stent \par \par TECHNIQUE: Multiplanar multisequence MRI of the brain and 3-D time-of-flight \par images through the [place] of Tran were obtained without the intravenous \par administration of contrast. 3-D time-of-flight images were reformatted using \par MIP protocol targeted over the head. \par \par Additionally, MRA NOVA was performed utilizing gated 2-D phase contrast MR \par imaged perpendicular to the vessel axis. \par \par COMPARISON: MRI and MRA brain 3/12/2019 \par \par FINDINGS: \par \par MRI BRAIN: \par \par There is no hydrocephalus, midline shift, mass effect, vasogenic edema, or \par intracranial hemorrhage. Diffusion weighted images demonstrate no acute \par territorial infarct. \par \par Flow voids are seen within the major intracranial vessels consistent with \par their patency. Susceptibility artifact in the region of the right \par supraclinoid ICA is consistent with the presence of an intravascular stent. \par \par The visualized paranasal sinuses and mastoid air cells are clear. The \par orbits, sellar and suprasellar structures, and craniocervical junction are \par unremarkable. \par \par MRA BRAIN: \par \par Redemonstration of susceptibility artifact in the region of the right \par cavernous and supraclinoid ICA, consistent with the presence of known right \par ICA pipeline stent. Normal flow-related signal within the more proximal and \par distal right ICA, consistent with stent patency. \par \par Interval appearance of susceptibility artifact in the region of the left \par cavernous and supraclinoid ICA, consistent with interval left ICA pipeline \par stent placement. Normal flow-related signal both proximal and distal to the \par stent in the right ICA is consistent with stent patency. \par \par Normal flow-related enhancement of the basilar artery, the intradural \par vertebral arteries, as well as the bilateral anterior, middle, and posterior \par cerebral arteries. \par \par The right intradural vertebral artery is hypoplastic and ends in the right \par PICA. \par \par No flow-limiting stenosis. \par \par Residual 4 x 2 mm posteromedially projecting left superior hypophyseal ICA \par aneurysm remnant. \par \par MRA NOVA: \par \par NOVA report available for neurosurgical review. \par \par IMPRESSION: \par \par MRI BRAIN: \par \par No acute intracranial hemorrhage, mass effect, vasogenic edema, or evidence \par of acute territorial infarct. \par \par MRA BRAIN: \par \par Redemonstration of susceptibility artifact in the region of the right \par cavernous and supraclinoid ICA, consistent with the presence of known right \par ICA pipeline stent. Normal flow-related signal within the more proximal and \par distal right ICA, consistent with stent patency. \par \par Interval appearance of susceptibility artifact in the region of the left \par cavernous and supraclinoid ICA, consistent with interval left ICA pipeline \par stent placement. Normal flow-related signal both proximal and distal to the \par stent in the right ICA, consistent with stent patency. \par \par Residual 4 x 2 mm posteromedially projecting left superior hypophyseal ICA \par aneurysm remnant. \par \par MRA NOVA: \par \par NOVA report available for neurosurgical review. \par \par \par \par \par \par \par \par \par \par \par \par \par GURINDER MOSS M.D., ATTENDING RADIOLOGIST \par This document has been electronically signed. May 29 2019 2:53PM \par \par \par

## 2019-06-05 NOTE — PHYSICAL EXAM
[General Appearance - In No Acute Distress] : in no acute distress [General Appearance - Alert] : alert [Dry] : dry [Clean] : clean [Intact] : intact [Healing Well] : healing well [Normal Skin] : normal [No Drainage] : without drainage [Oriented To Time, Place, And Person] : oriented to person, place, and time [Person] : oriented to person [Time] : oriented to time [Place] : oriented to place [Cranial Nerves Trigeminal (V)] : facial sensation intact symmetrically [Cranial Nerves Oculomotor (III)] : extraocular motion intact [Cranial Nerves Optic (II)] : visual acuity intact bilaterally,  pupils equal round and reactive to light [Cranial Nerves Facial (VII)] : face symmetrical [Cranial Nerves Vestibulocochlear (VIII)] : hearing was intact bilaterally [Cranial Nerves Glossopharyngeal (IX)] : tongue and palate midline [Cranial Nerves Accessory (XI - Cranial And Spinal)] : head turning and shoulder shrug symmetric [Motor Strength] : muscle strength was normal in all four extremities [Cranial Nerves Hypoglossal (XII)] : there was no tongue deviation with protrusion [] : no respiratory distress [Involuntary Movements] : no involuntary movements were seen [Exaggerated Use Of Accessory Muscles For Inspiration] : no accessory muscle use [Abnormal Walk] : normal gait [Erythema] : not erythematous [Warm] : not warm [FreeTextEntry1] : right groin

## 2019-06-05 NOTE — CONSULT LETTER
[Dear  ___] : Dear  [unfilled], [Consult Letter:] : I had the pleasure of evaluating your patient, [unfilled]. [DrMiguel A  ___] : Dr. WHITESIDE [Sincerely,] : Sincerely, [Consult Closing:] : Thank you very much for allowing me to participate in the care of this patient.  If you have any questions, please do not hesitate to contact me. [FreeTextEntry1] : As you know he us a 73yr old right handed male with a past medical history of prostate cancer treated with radiation in 2009, CAD with stents on Plavix and ASA. He experienced and episode of confusion and memory loss which prompted MRA of his head to be done. This MRA was significant for bilateral internal carotid artery aneurysms. He underwent diagnostic cerebral angiography in December 2018 which confirmed the presence of these two aneurysms.  We discussed treatment of these aneurysms including the risks and benefits of endovascular embolization with pipeline flow diversion and the patient wished to proceed.  On March 11, 2019 he underwent endovascular embolization of the right internal carotid artery aneurysm and then on May 28, 2019 he underwent endovascular embolization of the left internal carotid artery aneurysm with pipeline flow diversion. He tolerated the procedures well and was discharged home in stable condition. He remains neurologically intact. His PRU is therapeutic at 7. He will remain on aspirin and plavix. At this time I recommend MRA brain non con with NOVA in three months then follow up in the office after.  [FreeTextEntry3] : Jossy Trammell NP\par

## 2019-06-05 NOTE — DATA REVIEWED
[de-identified] : 5/28/2019 MRA brain non con with NOVA s/p pipeline  [de-identified] : cerebral angiogram with embolization of LICA aneurysm 5/28/2019

## 2019-09-25 ENCOUNTER — APPOINTMENT (OUTPATIENT)
Dept: MRI IMAGING | Facility: HOSPITAL | Age: 74
End: 2019-09-25

## 2019-09-25 ENCOUNTER — OUTPATIENT (OUTPATIENT)
Dept: OUTPATIENT SERVICES | Facility: HOSPITAL | Age: 74
LOS: 1 days | End: 2019-09-25
Payer: MEDICARE

## 2019-09-25 DIAGNOSIS — Z90.49 ACQUIRED ABSENCE OF OTHER SPECIFIED PARTS OF DIGESTIVE TRACT: Chronic | ICD-10-CM

## 2019-09-25 DIAGNOSIS — Z98.890 OTHER SPECIFIED POSTPROCEDURAL STATES: Chronic | ICD-10-CM

## 2019-09-25 DIAGNOSIS — Z95.5 PRESENCE OF CORONARY ANGIOPLASTY IMPLANT AND GRAFT: Chronic | ICD-10-CM

## 2019-09-25 DIAGNOSIS — I67.1 CEREBRAL ANEURYSM, NONRUPTURED: ICD-10-CM

## 2019-09-25 PROCEDURE — 70544 MR ANGIOGRAPHY HEAD W/O DYE: CPT | Mod: 26

## 2019-09-25 PROCEDURE — 70544 MR ANGIOGRAPHY HEAD W/O DYE: CPT

## 2019-10-28 ENCOUNTER — APPOINTMENT (OUTPATIENT)
Dept: NEUROSURGERY | Facility: CLINIC | Age: 74
End: 2019-10-28
Payer: MEDICARE

## 2019-10-28 PROCEDURE — 99213 OFFICE O/P EST LOW 20 MIN: CPT

## 2019-10-29 VITALS
HEART RATE: 63 BPM | HEIGHT: 71 IN | WEIGHT: 156 LBS | RESPIRATION RATE: 17 BRPM | BODY MASS INDEX: 21.84 KG/M2 | OXYGEN SATURATION: 95 % | TEMPERATURE: 98.8 F | DIASTOLIC BLOOD PRESSURE: 76 MMHG | SYSTOLIC BLOOD PRESSURE: 118 MMHG

## 2019-10-29 NOTE — REASON FOR VISIT
[Follow-Up: _____] : a [unfilled] follow-up visit [Spouse] : spouse [FreeTextEntry1] : Prema Johnson is a 73yr old right handed male here for a follow up visit. 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 He tolerated the procedure well, recovered in the PACU and was discharged home in stable condition after having hi MRA brain with NOVA completed. Since he has been home he has been feeling well. He denies any motor, sensory, speech,visual abnormalities. \par \par \par HPI: 73 y.o male with PMHx of CAD (s/p ENRIQUE x1 in LAD 2006), prostate carcinoma (s/p \par XRT 2009) s/p cerebral angiogram 12/2018 found to have bilateral supraclinoid \par internal carotid artery aneurysms. \par

## 2019-10-29 NOTE — ASSESSMENT
[FreeTextEntry1] : Impression: 73yr old male s/p LICA aneurysm embolization with pipeline flow diversion 5/28/2019; CRISTINA aneurysm embolized with pipeline flow diversion 3/11/2019 \par \par Plan: \par Reviewed patients MRA with NOVA demonstrates good intracranial blood flow, no in stent stenosis\par Continue Aspirin 325mg daily \par Continue Plavix 75mg daily (was previously on plavix prior to our procedure) PRU= 7 from 5/29/2019\par Cerebral Angiogram November 15, 2019. \par

## 2019-10-29 NOTE — RESULTS/DATA
[FreeTextEntry1] : \par EXAM: MR ANGIO BRAIN \par \par \par PROCEDURE DATE: 09/25/2019 \par \par \par \par \par INTERPRETATION: Clinical indication: Previous Pipeline embolization of left \par superior hypophyseal aneurysm; more remote Pipeline embolization right \par superior hypophyseal aneurysm \par \par \par \par  3-D axial noncontrast MRA were performed on the cervical and intracranial \par vessels, respectively. Intravascular flow quantification was performed using \par gated 2D phase contrast MR, imaged perpendicular to the vessel axis. Images \par were post processed NOVA software and a NOVA flow study report is available. \par \par Comparison is made with the prior MRI of 5/29/2019, nova MRA of the same \par date, and previous conventional angiogram of 5/28/2019 and 3/12/2019. \par \par There is signal dropout overlying the supraclinoid internal carotid arteries \par bilaterally related to the presence of the bilateral pipeline stents. There \par is good intracranial flow, similar to the prior MRA of 5/29/2019. \par \par There is an accessory right middle cerebral artery which is also unchanged. \par \par Flow is as follows in milliliters per minute. \par \par CRISTINA 225, RM21 86, RM22 26 RACA 129, RACA2 48 compared with prior 5/29/19 \par \par CRISTINA 205, RM21 34, RM22 106, RACA 110, RACA2 74. \par \par LICA 131, LMCA 148, LACA 35, LACA2 48 compared with prior \par \par LICA 158, LMCA 135, LACA 32, LACA2 56. \par \par BA 89, RPCA 50, LPCA 39 compared with \par \par , RPCA 38, LPCA 55. \par \par \par IMPRESSION: Signal dropout overlying the supraclinoid internal carotid \par arteries bilaterally related to the pipeline stents. Noninvasive flow MR \par angiography as above, without significant change since 5/29/2019. \par \par \par \par \par \par \par \par \par \par \par ABIGAIL SWIFT M.D., ATTENDING RADIOLOGIST \par This document has been electronically signed. Sep 25 2019 4:25PM

## 2019-10-29 NOTE — CONSULT LETTER
[Dear  ___] : Dear  [unfilled], [Consult Letter:] : I had the pleasure of evaluating your patient, [unfilled]. [Consult Closing:] : Thank you very much for allowing me to participate in the care of this patient.  If you have any questions, please do not hesitate to contact me. [DrMiguel A  ___] : Dr. WHITESIDE [FreeTextEntry1] : As you know he us a 73yr old right handed male with a past medical history of prostate cancer treated with radiation in 2009, CAD with stents on Plavix and ASA. He experienced and episode of confusion and memory loss which prompted MRA of his head to be done. This MRA was significant for bilateral internal carotid artery aneurysms. He underwent diagnostic cerebral angiography in December 2018 which confirmed the presence of these two aneurysms.  We discussed treatment of these aneurysms including the risks and benefits of endovascular embolization with pipeline flow diversion and the patient wished to proceed.  On March 11, 2019 he underwent endovascular embolization of the right internal carotid artery aneurysm and then on May 28, 2019 he underwent endovascular embolization of the left internal carotid artery aneurysm with pipeline flow diversion. He tolerated the procedures well and was discharged home in stable condition. He remains neurologically intact. His PRU is therapeutic at 7. He will remain on aspirin and plavix. I reviewed his MRA brain with NOVA which shows good intracranial blood flow, no in stent stenosis. At this time I recommend follow up cerebral angiography. The risks, benefits, alternatives, complications and personnel associated with the procedure were discussed with the patient and the family in great detail.  They request that we proceed November 15, 2019.  [FreeTextEntry3] : \par Sincerely,\par \par Yves Martinez MD\par Professor of Neurological Surgery and Radiology\par  Department of Neurosurgery\par Director Cerebrovascular Surgery\par [facility] [employer] [facility]\par

## 2019-11-05 ENCOUNTER — OUTPATIENT (OUTPATIENT)
Dept: OUTPATIENT SERVICES | Facility: HOSPITAL | Age: 74
LOS: 1 days | End: 2019-11-05
Payer: MEDICARE

## 2019-11-05 VITALS
WEIGHT: 162.04 LBS | HEART RATE: 56 BPM | DIASTOLIC BLOOD PRESSURE: 77 MMHG | HEIGHT: 71 IN | SYSTOLIC BLOOD PRESSURE: 122 MMHG | OXYGEN SATURATION: 99 % | TEMPERATURE: 98 F | RESPIRATION RATE: 18 BRPM

## 2019-11-05 DIAGNOSIS — I67.1 CEREBRAL ANEURYSM, NONRUPTURED: ICD-10-CM

## 2019-11-05 DIAGNOSIS — Z90.49 ACQUIRED ABSENCE OF OTHER SPECIFIED PARTS OF DIGESTIVE TRACT: Chronic | ICD-10-CM

## 2019-11-05 DIAGNOSIS — Z98.890 OTHER SPECIFIED POSTPROCEDURAL STATES: Chronic | ICD-10-CM

## 2019-11-05 DIAGNOSIS — Z95.5 PRESENCE OF CORONARY ANGIOPLASTY IMPLANT AND GRAFT: Chronic | ICD-10-CM

## 2019-11-05 DIAGNOSIS — Z01.818 ENCOUNTER FOR OTHER PREPROCEDURAL EXAMINATION: ICD-10-CM

## 2019-11-05 DIAGNOSIS — I10 ESSENTIAL (PRIMARY) HYPERTENSION: ICD-10-CM

## 2019-11-05 LAB
ANION GAP SERPL CALC-SCNC: 14 MMOL/L — SIGNIFICANT CHANGE UP (ref 5–17)
BLD GP AB SCN SERPL QL: NEGATIVE — SIGNIFICANT CHANGE UP
BUN SERPL-MCNC: 16 MG/DL — SIGNIFICANT CHANGE UP (ref 7–23)
CALCIUM SERPL-MCNC: 9.1 MG/DL — SIGNIFICANT CHANGE UP (ref 8.4–10.5)
CHLORIDE SERPL-SCNC: 104 MMOL/L — SIGNIFICANT CHANGE UP (ref 96–108)
CO2 SERPL-SCNC: 25 MMOL/L — SIGNIFICANT CHANGE UP (ref 22–31)
CREAT SERPL-MCNC: 0.61 MG/DL — SIGNIFICANT CHANGE UP (ref 0.5–1.3)
GLUCOSE SERPL-MCNC: 93 MG/DL — SIGNIFICANT CHANGE UP (ref 70–99)
HCT VFR BLD CALC: 45 % — SIGNIFICANT CHANGE UP (ref 39–50)
HGB BLD-MCNC: 14.6 G/DL — SIGNIFICANT CHANGE UP (ref 13–17)
MCHC RBC-ENTMCNC: 28.1 PG — SIGNIFICANT CHANGE UP (ref 27–34)
MCHC RBC-ENTMCNC: 32.4 GM/DL — SIGNIFICANT CHANGE UP (ref 32–36)
MCV RBC AUTO: 86.7 FL — SIGNIFICANT CHANGE UP (ref 80–100)
PLATELET # BLD AUTO: 191 K/UL — SIGNIFICANT CHANGE UP (ref 150–400)
POTASSIUM SERPL-MCNC: 4.3 MMOL/L — SIGNIFICANT CHANGE UP (ref 3.5–5.3)
POTASSIUM SERPL-SCNC: 4.3 MMOL/L — SIGNIFICANT CHANGE UP (ref 3.5–5.3)
RBC # BLD: 5.19 M/UL — SIGNIFICANT CHANGE UP (ref 4.2–5.8)
RBC # FLD: 13.3 % — SIGNIFICANT CHANGE UP (ref 10.3–14.5)
RH IG SCN BLD-IMP: POSITIVE — SIGNIFICANT CHANGE UP
SODIUM SERPL-SCNC: 143 MMOL/L — SIGNIFICANT CHANGE UP (ref 135–145)
WBC # BLD: 4.86 K/UL — SIGNIFICANT CHANGE UP (ref 3.8–10.5)
WBC # FLD AUTO: 4.86 K/UL — SIGNIFICANT CHANGE UP (ref 3.8–10.5)

## 2019-11-05 PROCEDURE — G0463: CPT

## 2019-11-05 PROCEDURE — 86900 BLOOD TYPING SEROLOGIC ABO: CPT

## 2019-11-05 PROCEDURE — 80048 BASIC METABOLIC PNL TOTAL CA: CPT

## 2019-11-05 PROCEDURE — 86901 BLOOD TYPING SEROLOGIC RH(D): CPT

## 2019-11-05 PROCEDURE — 85027 COMPLETE CBC AUTOMATED: CPT

## 2019-11-05 PROCEDURE — 86850 RBC ANTIBODY SCREEN: CPT

## 2019-11-05 RX ORDER — CLOPIDOGREL BISULFATE 75 MG/1
1 TABLET, FILM COATED ORAL
Qty: 0 | Refills: 0 | DISCHARGE

## 2019-11-05 RX ORDER — METOPROLOL TARTRATE 50 MG
1 TABLET ORAL
Qty: 0 | Refills: 0 | DISCHARGE

## 2019-11-05 RX ORDER — METOPROLOL TARTRATE 50 MG
0.5 TABLET ORAL
Qty: 0 | Refills: 0 | DISCHARGE

## 2019-11-05 RX ORDER — SIMVASTATIN 20 MG/1
0 TABLET, FILM COATED ORAL
Qty: 0 | Refills: 0 | DISCHARGE

## 2019-11-05 RX ORDER — METOPROLOL TARTRATE 50 MG
0 TABLET ORAL
Qty: 0 | Refills: 0 | DISCHARGE

## 2019-11-05 NOTE — H&P PST ADULT - NEGATIVE NEUROLOGICAL SYMPTOMS
no difficulty walking/no confusion/no focal seizures/no weakness/no generalized seizures/no loss of consciousness/no syncope/no tremors/no headache

## 2019-11-05 NOTE — H&P PST ADULT - NEUROLOGICAL DETAILS
responds to pain/responds to verbal commands/no spontaneous movement/sensation intact/alert and oriented x 3/cranial nerves intact/normal strength

## 2019-11-05 NOTE — H&P PST ADULT - NSICDXPASTSURGICALHX_GEN_ALL_CORE_FT
PAST SURGICAL HISTORY:  H/O hernia repair x3  1980, 1983, 2002 with mesh  left inguinal    S/P appendectomy     S/P coil embolization of cerebral aneurysm right 3/2019 left ICA 5/28/19    S/P drug eluting coronary stent placement LAD 2006

## 2019-11-05 NOTE — H&P PST ADULT - NSICDXPROBLEM_GEN_ALL_CORE_FT
PROBLEM DIAGNOSES  Problem: Cerebral aneurysm  Assessment and Plan: Pt scheduled for sx on 11/15/2019. Surgical and chlorhexidine instructions reviewed w/ pt. Pt to continue Plavix and ASA until procedure as per Dr. Martinez.     Problem: HTN (hypertension)  Assessment and Plan: Pt to continue oral medication as prescribed.

## 2019-11-05 NOTE — H&P PST ADULT - HISTORY OF PRESENT ILLNESS
74 yo pleasant male with PMH of HTN, HLD, CAD (s/p ENRIQUE x1 in LAD 2006) and prostate carcinoma (s/p XRT 2009); s/p cerebral angiogram 12/2018 found to have bilateral supraclinoid internal carotid artery aneurysms . Pt s/p Right  cerebral angiogram with embolization with 3 pipeline stents on 3/11/19 and left pipeline embo for LEFT ICA on 5/28/19. Pt  now presents to PST for a Cerebral Angiogram on 11/15/2019. Pt denies headaches, confusion, syncope, SOB or chest pain and feels well otherwise.

## 2019-11-05 NOTE — H&P PST ADULT - NSICDXPASTMEDICALHX_GEN_ALL_CORE_FT
PAST MEDICAL HISTORY:  Brain aneurysm     CAD (coronary artery disease) denies MI    Cerebral aneurysm bilateral    HLD (hyperlipidemia)     HTN (hypertension)     Prostate cancer s/p XRT 2009

## 2019-11-15 ENCOUNTER — TRANSCRIPTION ENCOUNTER (OUTPATIENT)
Age: 74
End: 2019-11-15

## 2019-11-15 ENCOUNTER — APPOINTMENT (OUTPATIENT)
Dept: NEUROSURGERY | Facility: HOSPITAL | Age: 74
End: 2019-11-15
Payer: MEDICARE

## 2019-11-15 ENCOUNTER — OUTPATIENT (OUTPATIENT)
Dept: OUTPATIENT SERVICES | Facility: HOSPITAL | Age: 74
LOS: 1 days | End: 2019-11-15
Payer: MEDICARE

## 2019-11-15 VITALS
TEMPERATURE: 98 F | HEART RATE: 58 BPM | DIASTOLIC BLOOD PRESSURE: 71 MMHG | SYSTOLIC BLOOD PRESSURE: 122 MMHG | OXYGEN SATURATION: 100 % | RESPIRATION RATE: 18 BRPM

## 2019-11-15 DIAGNOSIS — I67.1 CEREBRAL ANEURYSM, NONRUPTURED: ICD-10-CM

## 2019-11-15 DIAGNOSIS — Z98.890 OTHER SPECIFIED POSTPROCEDURAL STATES: Chronic | ICD-10-CM

## 2019-11-15 DIAGNOSIS — Z90.49 ACQUIRED ABSENCE OF OTHER SPECIFIED PARTS OF DIGESTIVE TRACT: Chronic | ICD-10-CM

## 2019-11-15 DIAGNOSIS — Z95.5 PRESENCE OF CORONARY ANGIOPLASTY IMPLANT AND GRAFT: Chronic | ICD-10-CM

## 2019-11-15 LAB
ANION GAP SERPL CALC-SCNC: 8 MMOL/L — SIGNIFICANT CHANGE UP (ref 5–17)
BUN SERPL-MCNC: 11 MG/DL — SIGNIFICANT CHANGE UP (ref 7–23)
CALCIUM SERPL-MCNC: 9 MG/DL — SIGNIFICANT CHANGE UP (ref 8.4–10.5)
CHLORIDE SERPL-SCNC: 108 MMOL/L — SIGNIFICANT CHANGE UP (ref 96–108)
CO2 SERPL-SCNC: 24 MMOL/L — SIGNIFICANT CHANGE UP (ref 22–31)
CREAT SERPL-MCNC: 0.75 MG/DL — SIGNIFICANT CHANGE UP (ref 0.5–1.3)
GLUCOSE SERPL-MCNC: 102 MG/DL — HIGH (ref 70–99)
HCT VFR BLD CALC: 41.6 % — SIGNIFICANT CHANGE UP (ref 39–50)
HGB BLD-MCNC: 13.9 G/DL — SIGNIFICANT CHANGE UP (ref 13–17)
MAGNESIUM SERPL-MCNC: 2.1 MG/DL — SIGNIFICANT CHANGE UP (ref 1.6–2.6)
MCHC RBC-ENTMCNC: 27.8 PG — SIGNIFICANT CHANGE UP (ref 27–34)
MCHC RBC-ENTMCNC: 33.4 GM/DL — SIGNIFICANT CHANGE UP (ref 32–36)
MCV RBC AUTO: 83.2 FL — SIGNIFICANT CHANGE UP (ref 80–100)
NRBC # BLD: 0 /100 WBCS — SIGNIFICANT CHANGE UP (ref 0–0)
PA ADP PRP-ACNC: 71 PRU — LOW (ref 194–417)
PHOSPHATE SERPL-MCNC: 2.9 MG/DL — SIGNIFICANT CHANGE UP (ref 2.5–4.5)
PLATELET # BLD AUTO: 182 K/UL — SIGNIFICANT CHANGE UP (ref 150–400)
POTASSIUM SERPL-MCNC: 3.7 MMOL/L — SIGNIFICANT CHANGE UP (ref 3.5–5.3)
POTASSIUM SERPL-SCNC: 3.7 MMOL/L — SIGNIFICANT CHANGE UP (ref 3.5–5.3)
RBC # BLD: 5 M/UL — SIGNIFICANT CHANGE UP (ref 4.2–5.8)
RBC # FLD: 13.2 % — SIGNIFICANT CHANGE UP (ref 10.3–14.5)
SODIUM SERPL-SCNC: 140 MMOL/L — SIGNIFICANT CHANGE UP (ref 135–145)
WBC # BLD: 4.91 K/UL — SIGNIFICANT CHANGE UP (ref 3.8–10.5)
WBC # FLD AUTO: 4.91 K/UL — SIGNIFICANT CHANGE UP (ref 3.8–10.5)

## 2019-11-15 PROCEDURE — 93970 EXTREMITY STUDY: CPT | Mod: 26

## 2019-11-15 PROCEDURE — 36224 PLACE CATH CAROTD ART: CPT | Mod: 50

## 2019-11-15 PROCEDURE — 61624 TCAT PERM OCCLS/EMBOLJ CNS: CPT

## 2019-11-15 PROCEDURE — 36228 PLACE CATH INTRACRANIAL ART: CPT | Mod: LT

## 2019-11-15 PROCEDURE — 75894 X-RAYS TRANSCATH THERAPY: CPT | Mod: 26

## 2019-11-15 PROCEDURE — 75898 FOLLOW-UP ANGIOGRAPHY: CPT | Mod: 26

## 2019-11-15 RX ORDER — CHLORHEXIDINE GLUCONATE 213 G/1000ML
1 SOLUTION TOPICAL
Refills: 0 | Status: DISCONTINUED | OUTPATIENT
Start: 2019-11-15 | End: 2019-11-30

## 2019-11-15 RX ORDER — ASPIRIN/CALCIUM CARB/MAGNESIUM 324 MG
325 TABLET ORAL
Refills: 0 | Status: DISCONTINUED | OUTPATIENT
Start: 2019-11-16 | End: 2019-11-30

## 2019-11-15 RX ORDER — ACETAMINOPHEN 500 MG
650 TABLET ORAL EVERY 6 HOURS
Refills: 0 | Status: DISCONTINUED | OUTPATIENT
Start: 2019-11-15 | End: 2019-11-30

## 2019-11-15 RX ORDER — CLOPIDOGREL BISULFATE 75 MG/1
75 TABLET, FILM COATED ORAL
Refills: 0 | Status: DISCONTINUED | OUTPATIENT
Start: 2019-11-15 | End: 2019-11-30

## 2019-11-15 RX ORDER — SODIUM CHLORIDE 9 MG/ML
1000 INJECTION INTRAMUSCULAR; INTRAVENOUS; SUBCUTANEOUS
Refills: 0 | Status: DISCONTINUED | OUTPATIENT
Start: 2019-11-15 | End: 2019-11-16

## 2019-11-15 RX ORDER — SIMVASTATIN 20 MG/1
20 TABLET, FILM COATED ORAL AT BEDTIME
Refills: 0 | Status: DISCONTINUED | OUTPATIENT
Start: 2019-11-15 | End: 2019-11-30

## 2019-11-15 RX ADMIN — SODIUM CHLORIDE 70 MILLILITER(S): 9 INJECTION INTRAMUSCULAR; INTRAVENOUS; SUBCUTANEOUS at 17:48

## 2019-11-15 RX ADMIN — SIMVASTATIN 20 MILLIGRAM(S): 20 TABLET, FILM COATED ORAL at 21:51

## 2019-11-15 RX ADMIN — CHLORHEXIDINE GLUCONATE 1 APPLICATION(S): 213 SOLUTION TOPICAL at 22:00

## 2019-11-15 NOTE — CHART NOTE - NSCHARTNOTEFT_GEN_A_CORE
Interventional Neuro- Radiology   Procedure Note      Procedure: Selective Cerebral Angiography and pipeline embolization   Pre- Procedure Diagnosis: Bilateral  superior hypophyseal aneurysms  Post- Procedure Diagnosis: s/p pipeline embolization of left superior hypophyseal aneurysm    : Dr. Juan MD  Resident: Dr. Chevy MD   Physician Assistant: Fabi Diaz PA-C    RN: Gwen     I/Os:  Fluids: 0cc DTV  Contrast: 82cc   Estimated Blood Loss: <10cc    Preliminary Report:  Using a 5Fr Fabuki sheath to the right groin examination of left internal carotid artery and right internal carotid artery via selective cerebral angiography demonstrates complete occlusion of right superior hypophyseal aneurysm, residual filling of left superior hypophyseal aneurysm, and additional pipeline stent added across the aneurysm ( 3.75 x 10mm). ( Official note to follow).    Patient tolerated procedure well, vital signs stable, hemodynamically stable, no change in neurological status compared to baseline. Results discussed with neurosurgery/ patient and their family. Groin sheath d/c'ed, manual compression held to hemostasis, no active bleeding, no hematoma, star-close device applied, quick clot and safeguard balloon dressing applied at 14:15h. Patient transferred to PACU for further care/ monitoring.    Fabi Diaz PA-C  [phone]

## 2019-11-15 NOTE — DISCHARGE NOTE PROVIDER - NSDCMRMEDTOKEN_GEN_ALL_CORE_FT
aspirin 325 mg oral tablet: 1 tab(s) orally once a day  multivitamin: orally once a day  Plavix 75 mg oral tablet: 1 tab(s) orally once a day  simvastatin 20 mg oral tablet: 1 tab(s) orally once a day (at bedtime)  Toprol-XL 25 mg oral tablet, extended release: 0.5 tab(s) orally once a day acetaminophen 325 mg oral tablet: 2 tab(s) orally every 6 hours, As needed, Temp greater or equal to 38C (100.4F), Mild Pain (1 - 3)  aspirin 325 mg oral tablet: 1 tab(s) orally once a day  multivitamin: orally once a day  Plavix 75 mg oral tablet: 1 tab(s) orally once a day  simvastatin 20 mg oral tablet: 1 tab(s) orally once a day (at bedtime)  Toprol-XL 25 mg oral tablet, extended release: 0.5 tab(s) orally once a day

## 2019-11-15 NOTE — CHART NOTE - NSCHARTNOTEFT_GEN_A_CORE
Interventional Neuro Radiology  Pre-Procedure Note    This is a 72yo right hand dominant s/p cerebral angiogram 12/2018 found to have bilateral supraclinoid internal carotid artery aneurysms . Patient s/p left superior hypophyseal   aneurysm pipeline embolization 5/28/19 and of right supraclinoid internal carotid artery aneurysm s/p right  cerebral angiogram with embolization with 3 pipeline stents on 3/11/19.  Patient presents today to neuro IR for selective cerebral angiography follow up.     Neuro Exam: Awake and alert, oriented x3, fluent, normal naming and repetition, follows 3 step commands. Extraocular movements intact, no nystagmus, visual fields full, face symmetric, tongue midline. No drift, 5/5 power x 4 extremities. Normal sensation to LT. Normal finger-to-nose and rapid alternating movements.    PAST MEDICAL & SURGICAL HISTORY:  HTN (hypertension)  HLD (hyperlipidemia)  Cerebral aneurysm: bilateral  Prostate cancer: s/p XRT 2009  CAD (coronary artery disease): denies MI  S/P coil embolization of cerebral aneurysm: right 3/2019 left ICA 5/28/19  S/P appendectomy  S/P drug eluting coronary stent placement: LAD 2006  H/O hernia repair: x3  1980, 1983, 2002 with mesh  left inguinal    Social History:   Denies tobacco use    FAMILY HISTORY:  No pertinent family history     Allergies:   Zithromax (Unknown)    Current Medications:   · 	Plavix 75 mg oral tablet: Last Dose Taken:  , 1 tab(s) orally once a day  · 	aspirin 325 mg oral tablet: Last Dose Taken:  , 1 tab(s) orally once a day  · 	simvastatin 20 mg oral tablet: Last Dose Taken:  , 1 tab(s) orally once a day (at bedtime)  · 	multivitamin: Last Dose Taken:  , orally once a day  · 	Toprol-XL 25 mg oral tablet, extended release: Last Dose Taken:  , 0.5 tab(s) orally once a day    Labs:   Seen and reviewed in sunrise.       Assessment/Plan:   This is a 72yo right hand dominant Male  presents s/p pipeline embolization of bilateral superior hypophyseal aneurysms. Patient presents to neuro-IR for selective cerebral angiography. Procedure/ risks/ benefits/ goals/ alternatives were explained. Risks include but are not limited to stroke/ vessel injury/ hemorrhage/ groin hematoma. All questions answered. Informed content obtained from patient_. Consent placed in chart.    Fabi Diaz PA-C  [phone] Interventional Neuro Radiology  Pre-Procedure Note    This is a 72yo right hand dominant s/p cerebral angiogram 12/2018 found to have bilateral supraclinoid internal carotid artery aneurysms . Patient s/p left superior hypophyseal aneurysm pipeline embolization 5/28/19 and of right supraclinoid internal carotid artery aneurysm s/p right  cerebral angiogram with embolization with 3 pipeline stents on 3/11/19.  Patient presents today to neuro IR for selective cerebral angiography, and possible stent placement.     Neuro Exam: Awake and alert, oriented x3, fluent, normal naming and repetition, follows 3 step commands. Extraocular movements intact, no nystagmus, visual fields full, face symmetric, tongue midline. No drift, 5/5 power x 4 extremities. Normal sensation to LT. Normal finger-to-nose and rapid alternating movements.    PAST MEDICAL & SURGICAL HISTORY:  HTN (hypertension)  HLD (hyperlipidemia)  Cerebral aneurysm: bilateral  Prostate cancer: s/p XRT 2009  CAD (coronary artery disease): denies MI  S/P coil embolization of cerebral aneurysm: right 3/2019 left ICA 5/28/19  S/P appendectomy  S/P drug eluting coronary stent placement: LAD 2006  H/O hernia repair: x3  1980, 1983, 2002 with mesh  left inguinal    Social History:   Denies tobacco use    FAMILY HISTORY:  No pertinent family history     Allergies:   Zithromax (Unknown)    Current Medications:   · 	Plavix 75 mg oral tablet: Last Dose Taken:  , 1 tab(s) orally once a day  · 	aspirin 325 mg oral tablet: Last Dose Taken:  , 1 tab(s) orally once a day  · 	simvastatin 20 mg oral tablet: Last Dose Taken:  , 1 tab(s) orally once a day (at bedtime)  · 	multivitamin: Last Dose Taken:  , orally once a day  · 	Toprol-XL 25 mg oral tablet, extended release: Last Dose Taken:  , 0.5 tab(s) orally once a day    Labs:   Seen and reviewed in sunrise.       Assessment/Plan:   This is a 72yo right hand dominant Male  presents s/p pipeline embolization of bilateral superior hypophyseal aneurysms. Patient presents to neuro-IR for selective cerebral angiography, and possible stent placement. Procedure/ risks/ benefits/ goals/ alternatives were explained. Risks include but are not limited to stroke/ vessel injury/ hemorrhage/ groin hematoma. All questions answered. Informed content obtained from patient_. Consent placed in chart.    Fabi Diaz PA-C  [phone]

## 2019-11-15 NOTE — CHART NOTE - NSCHARTNOTEFT_GEN_A_CORE
CAPRINI SCORE [CLOT] Score on Admission for     AGE RELATED RISK FACTORS                                                       MOBILITY RELATED FACTORS  [ ] Age 41-60 years                                            (1 Point)                  [ ] Bed rest                                                        (1 Point)  [ ] Age: 61-74 years                                           (2 Points)                 [ ] Plaster cast                                                   (2 Points)  [x ] Age= 75 years                                              (3 Points)                 [ ] Bed bound for more than 72 hours                 (2 Points)    DISEASE RELATED RISK FACTORS                                               GENDER SPECIFIC FACTORS  [ ] Edema in the lower extremities                       (1 Point)                  [ ] Pregnancy                                                     (1 Point)  [ ] Varicose veins                                               (1 Point)                  [ ] Post-partum < 6 weeks                                   (1 Point)             [ ] BMI > 25 Kg/m2                                            (1 Point)                  [ ] Hormonal therapy  or oral contraception          (1 Point)                 [ ] Sepsis (in the previous month)                        (1 Point)                  [ ] History of pregnancy complications                 (1 point)  [ ] Pneumonia or serious lung disease                                               [ ] Unexplained or recurrent                     (1 Point)           (in the previous month)                               (1 Point)  [ ] Abnormal pulmonary function test                     (1 Point)                 SURGERY RELATED RISK FACTORS (include planned surgeries)  [ ] Acute myocardial infarction                              (1 Point)                 [ ]  Section                                             (1 Point)  [ ] Congestive heart failure (in the previous month)  (1 Point)               [ ] Minor surgery                                                  (1 Point)   [ ] Inflammatory bowel disease                             (1 Point)                 [ ] Arthroscopic surgery                                        (2 Points)  [ ] Central venous access                                      (2 Points)                [ x] General surgery lasting more than 45 minutes   (2 Points)       [ ] Stroke (in the previous month)                          (5 Points)               [ ] Elective arthroplasty                                         (5 Points)            [x ] Current or past malignancy                                (2 Points)                                                                                                     HEMATOLOGY RELATED FACTORS                                                 TRAUMA RELATED RISK FACTORS  [ ] Prior episodes of VTE                                     (3 Points)                [ ] Fracture of the hip, pelvis, or leg                       (5 Points)  [ ] Positive family history for VTE                         (3 Points)                 [ ] Acute spinal cord injury (in the previous month)  (5 Points)  [ ] Prothrombin 35863 A                                     (3 Points)                 [ ] Paralysis  (less than 1 month)                             (5 Points)  [ ] Factor V Leiden                                             (3 Points)                  [ ] Multiple Trauma within 1 month                        (5 Points)  [ ] Lupus anticoagulants                                     (3 Points)                                                           [ ] Anticardiolipin antibodies                               (3 Points)                                                       [ ] High homocysteine in the blood                      (3 Points)                                             [ ] Other congenital or acquired thrombophilia      (3 Points)                                                [ ] Heparin induced thrombocytopenia                  (3 Points)                                          Total Score [  7        ]    Risk:  Very low 0   Low 1 to 2   Moderate 3 to 4   High =5       VTE Prophylasix Recommednations:  [ x] mechanical pneumatic compression devices                                      [ ] contraindicated: _____________________  [ ] chemo prophylasix                                                                                   [ x] contraindicated _____________________    **** HIGH LIKELIHOOD DVT PRESENT ON ADMISSION  [x ] (please order LE dopplers within 24 hours of admission)

## 2019-11-15 NOTE — DISCHARGE NOTE PROVIDER - HOSPITAL COURSE
73 year old male with medical history of hypertension, hyperlipidemia, CAD (s/p ENRIQUE x 1 in LAD 2006), prostate carcinoma (s/p XRT 2009), s/p angiogram 12/2018 found to have bilateral supraclinoid internal carotid artery aneurysms. Patient was s/p right cerebral angio: embolization with 3 pipeline stents on 3/11/19 and s/p cerebral angio: embolization for left pipeline stent on 5/28/19. Patient presents on 11/15 for a follow up angio: which shows residual filling on the left superior hypophyseal aneurysm, subsequently additional stent was placed for embolization with Dr. Martinez. Patient tolerated procedure well with no issues. 73 year old male with medical history of hypertension, hyperlipidemia, CAD (s/p ENRIQUE x 1 in LAD 2006), prostate carcinoma (s/p XRT 2009), s/p angiogram 12/2018 found to have bilateral supraclinoid internal carotid artery aneurysms. Patient was s/p right cerebral angio: embolization with 3 pipeline stents on 3/11/19 and s/p cerebral angio: embolization for left pipeline stent on 5/28/19. Patient presents on 11/15 for a follow up angio: which shows residual filling on the left superior hypophyseal aneurysm, subsequently additional stent was placed for embolization with Dr. Martinez. Patient tolerated procedure well with no issues and stayed in PACU post-angio for further care and management. On 11/16 patient is neurologically and hemodynamically stable for discharger per Dr. Martinez.        More than 30 minutes were spent educating the patient and family regarding condition, medications, follow up plans, signs and symptoms to be concerned with, preparing paperwork, and questions answered regarding discharge.

## 2019-11-15 NOTE — DISCHARGE NOTE PROVIDER - NSDCFUADDINST_GEN_ALL_CORE_FT
Please make all necessary appointment and follow up. Please continue taking your Aspirin and Plavix per your Neurosurgeon.  Please do NOT take any NSAIDs (Ibuprofen, Advil, Aleve and Motrin) until cleared by your Neurosurgeon. Please go to the emergency room for any of the following: altered mental status, seizures, leaking / bleeding from incision site, pain uncontrolled by medications, chest pain and shortness of breath.

## 2019-11-15 NOTE — PROGRESS NOTE ADULT - SUBJECTIVE AND OBJECTIVE BOX
s/p pipeline embolization of L superior hypophyseal aneurysm    Vitals/labs/meds reviewed  alert, fully oriented, neurologically intact exam

## 2019-11-15 NOTE — PROGRESS NOTE ADULT - ASSESSMENT
POD#0     cont ASA/plavix  d/c safeguard this evening  IVL as diet advance as tolerated  d/c eber kramer/c yareli once safeguard d/c  d/c home in am

## 2019-11-15 NOTE — PROGRESS NOTE ADULT - ASSESSMENT
ASSESSMENT/PLAN:    NEURO:  Activity: [] mobilize as tolerated [] Bedrest [] PT [] OT [] PMNR    PULM:    CV:  SBP goal    RENAL:  Fluids:    GI:  Diet:  GI prophylaxis [] not indicated [] PPI [] other:  Bowel regimen [] colace [] senna [] other:    ENDO:   Goal euglycemia (-180)    HEME/ONC:  VTE prophylaxis: [] SCDs [] chemoprophylaxis [] hold chemoprophylaxis due to: [] high risk of DVT/PE on admission due to:    ID:    MISC:    SOCIAL/FAMILY:  [] awaiting [] updated at bedside [] family meeting    CODE STATUS:  [] Full Code [] DNR [] DNI [] Palliative/Comfort Care    DISPOSITION:  [] ICU [] Stroke Unit [] Floor [] EMU [] RCU [] PCU    [] Patient is at high risk of neurologic deterioration/death due to:     Time seen:  Time spent: ___ [] critical care minutes    Contact: [phone] ASSESSMENT/PLAN: 74 y/o with h/o B/L supraclinoid ICA aneurysms (treated in past). POD 0 s/p  L ICA pipeline stent.     NEURO:  Q1 neurochecks  ASA, plavix  Per NSGY, no routine imaging required.   P2Y12 71  Activity: [] mobilize as tolerated [x] Bedrest until groin precautions [] PT [] OT [] PMNR    PULM:  RA    CV:  SBP goal <140  ASA/plavix, statin    RENAL:  Fluids: IVL once tolerating    GI:  Diet: Regular  GI prophylaxis [x] not indicated [] PPI [] other:  Bowel regimen [] colace [] senna [] other:    ENDO:   Goal euglycemia (-180)    HEME/ONC:  VTE prophylaxis: [] SCDs [] chemoprophylaxis [x] hold chemoprophylaxis due to: fresh post op  [] high risk of DVT/PE on admission due to: history of prostate Ca    ID:  Monitor for fevers.     SOCIAL/FAMILY:  [x] awaiting [] updated at bedside [] family meeting    CODE STATUS:  [x] Full Code [] DNR [] DNI [] Palliative/Comfort Care    DISPOSITION:  [] ICU [] Stroke Unit [] Floor [] EMU [] RCU [] PCU [x]PACU. Anticipate discharge home in the am.     [x] Patient is at high risk of neurologic deterioration/death due to: hemorrhage, stroke      Time spent: ___ [] critical care minutes    Contact: [phone]

## 2019-11-15 NOTE — DISCHARGE NOTE PROVIDER - NSDCCPCAREPLAN_GEN_ALL_CORE_FT
PRINCIPAL DISCHARGE DIAGNOSIS  Diagnosis: S/P aneurysm repair  Assessment and Plan of Treatment: s/p pipeline stent embolization of left superior hypophyseal aneurysm 11/15  Please make an appointment and follow up with Dr. Martinez in one week after discharge. Please continue your Aspirin 325 and Plavix 75 daily.

## 2019-11-15 NOTE — DISCHARGE NOTE PROVIDER - NSDCACTIVITY_GEN_ALL_CORE
Showering allowed/No heavy lifting/straining/Do not drive or operate machinery/Do not make important decisions

## 2019-11-15 NOTE — PROGRESS NOTE ADULT - SUBJECTIVE AND OBJECTIVE BOX
HPI:    On admission, the patient was:  GCS:  [place]:  modified Sandoval:  ICH score:  NIHSS:    *** HIGH RISK OF DVT PRESENT ON ADMISSION ***    VITALS:  T(C): --  HR: --  BP: --  ABP: --  RR: --  SpO2: --  Wt(kg): --      LABS:  Na:   K:   Cl:   CO2:   BUN:   Cr:   Glu:     Hgb:   Hct:   WBC:   Plt:       IMAGING:   Recent imaging studies were reviewed.    MEDICATIONS:  acetaminophen   Tablet .. 650 milliGRAM(s) Oral every 6 hours PRN  clopidogrel Tablet 75 milliGRAM(s) Oral <User Schedule>  simvastatin 20 milliGRAM(s) Oral at bedtime  sodium chloride 0.9%. 1000 milliLiter(s) IV Continuous <Continuous>    EXAMINATION:  General:  calm  HEENT:  MMM  Neuro:  awake, alert, oriented x 3, follows commands, moves all extremities  Cards:  RRR  Respiratory:  no respiratory distress  Adomen:  soft  Extremities:  no edema  Skin:  warm/dry Pt is a 72 y/o man PMH of HTN, HLD, CAD (s/p ENRIQUE x1 in LAD 2006) and prostate carcinoma (s/p XRT 2009); s/p cerebral angiogram 12/2018 found to have bilateral supraclinoid internal carotid artery aneurysms . Pt s/p Right  cerebral angiogram with embolization with 3 pipeline stents on 3/11/19 and left pipeline embo for LEFT ICA on 5/28/19.  Presented for scheduled DSA 11/15. Underwent L ICA pipeline stent.   Seen postoperatively in the PACU.       On admission, the patient was:  GCS:15    *** HIGH RISK OF DVT PRESENT ON ADMISSION due to history of prostate cancer.     ROS: Asymptomatic. Denies complaints.     VITALS/IMAGING/ LABS: All were reviewed.    MEDICATIONS:  acetaminophen   Tablet .. 650 milliGRAM(s) Oral every 6 hours PRN  clopidogrel Tablet 75 milliGRAM(s) Oral <User Schedule>  simvastatin 20 milliGRAM(s) Oral at bedtime  sodium chloride 0.9%. 1000 milliLiter(s) IV Continuous <Continuous>    EXAMINATION:  General:  calm  HEENT:  MMM  Neuro:  awake, alert, oriented x 3, follows commands, moves all extremities 5/5 no drift. (RLE deferred re: safeguard precautions)  Cards:  RRR  Respiratory:  no respiratory distress  Abdomen:  soft  Extremities:  no edema  Skin:  warm/dry. R groin safeguard site C/D/I/. No ecchymosis/hematoma or pulsatile masses.

## 2019-11-16 ENCOUNTER — TRANSCRIPTION ENCOUNTER (OUTPATIENT)
Age: 74
End: 2019-11-16

## 2019-11-16 VITALS
HEART RATE: 55 BPM | DIASTOLIC BLOOD PRESSURE: 60 MMHG | TEMPERATURE: 98 F | RESPIRATION RATE: 18 BRPM | SYSTOLIC BLOOD PRESSURE: 120 MMHG | OXYGEN SATURATION: 98 %

## 2019-11-16 PROCEDURE — C1769: CPT

## 2019-11-16 PROCEDURE — 85027 COMPLETE CBC AUTOMATED: CPT

## 2019-11-16 PROCEDURE — 36228 PLACE CATH INTRACRANIAL ART: CPT

## 2019-11-16 PROCEDURE — 75894 X-RAYS TRANSCATH THERAPY: CPT

## 2019-11-16 PROCEDURE — 75898 FOLLOW-UP ANGIOGRAPHY: CPT

## 2019-11-16 PROCEDURE — 85576 BLOOD PLATELET AGGREGATION: CPT

## 2019-11-16 PROCEDURE — 84100 ASSAY OF PHOSPHORUS: CPT

## 2019-11-16 PROCEDURE — C1887: CPT

## 2019-11-16 PROCEDURE — C1894: CPT

## 2019-11-16 PROCEDURE — 61624 TCAT PERM OCCLS/EMBOLJ CNS: CPT

## 2019-11-16 PROCEDURE — 83735 ASSAY OF MAGNESIUM: CPT

## 2019-11-16 PROCEDURE — 93970 EXTREMITY STUDY: CPT

## 2019-11-16 PROCEDURE — 80048 BASIC METABOLIC PNL TOTAL CA: CPT

## 2019-11-16 PROCEDURE — C1760: CPT

## 2019-11-16 PROCEDURE — 36224 PLACE CATH CAROTD ART: CPT

## 2019-11-16 PROCEDURE — C1889: CPT

## 2019-11-16 RX ORDER — ACETAMINOPHEN 500 MG
2 TABLET ORAL
Qty: 0 | Refills: 0 | DISCHARGE
Start: 2019-11-16

## 2019-11-16 RX ADMIN — CLOPIDOGREL BISULFATE 75 MILLIGRAM(S): 75 TABLET, FILM COATED ORAL at 05:52

## 2019-11-16 RX ADMIN — Medication 325 MILLIGRAM(S): at 05:52

## 2019-11-16 NOTE — DISCHARGE NOTE NURSING/CASE MANAGEMENT/SOCIAL WORK - NSDCPEPTSTRK_GEN_ALL_CORE
Stroke support groups for patients, families, and friends/Stroke education booklet/Stroke warning signs and symptoms/Signs and symptoms of stroke/Need for follow up after discharge/Risk factors for stroke/Call 911 for stroke/Prescribed medications

## 2019-11-16 NOTE — DISCHARGE NOTE NURSING/CASE MANAGEMENT/SOCIAL WORK - PATIENT PORTAL LINK FT
You can access the FollowMyHealth Patient Portal offered by [facility] by registering at the following website: [email or web address]. By joining [employer]’s FollowMyHealth portal, you will also be able to view your health information using other applications (apps) compatible with our system.

## 2019-11-25 DIAGNOSIS — I67.1 CEREBRAL ANEURYSM, NONRUPTURED: ICD-10-CM

## 2019-11-29 ENCOUNTER — TRANSCRIPTION ENCOUNTER (OUTPATIENT)
Age: 74
End: 2019-11-29

## 2019-12-04 ENCOUNTER — INBOUND DOCUMENT (OUTPATIENT)
Age: 74
End: 2019-12-04

## 2019-12-09 ENCOUNTER — APPOINTMENT (OUTPATIENT)
Dept: NEUROSURGERY | Facility: CLINIC | Age: 74
End: 2019-12-09
Payer: MEDICARE

## 2019-12-09 NOTE — ASSESSMENT
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 \par

## 2019-12-09 NOTE — REASON FOR VISIT
[Follow-Up: _____] : a [unfilled] follow-up visit [FreeTextEntry1] : Prema Johnson is a 74yr old right handed male here for a follow up visit. 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  He had a follow up cerebral angiogram 11/15/2019 which noted the L superior hypophyseal artery aneurysm was still filling and a new pipeline flow diverter was placed. He tolerated the procedure well and remained neurologically intact. 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 \par

## 2019-12-23 ENCOUNTER — APPOINTMENT (OUTPATIENT)
Dept: NEUROSURGERY | Facility: CLINIC | Age: 74
End: 2019-12-23
Payer: MEDICARE

## 2019-12-23 VITALS
TEMPERATURE: 97.4 F | OXYGEN SATURATION: 98 % | BODY MASS INDEX: 22.82 KG/M2 | SYSTOLIC BLOOD PRESSURE: 101 MMHG | RESPIRATION RATE: 16 BRPM | DIASTOLIC BLOOD PRESSURE: 60 MMHG | HEART RATE: 61 BPM | HEIGHT: 71 IN | WEIGHT: 163 LBS

## 2019-12-23 PROCEDURE — 99213 OFFICE O/P EST LOW 20 MIN: CPT

## 2019-12-26 NOTE — REASON FOR VISIT
[Follow-Up: _____] : a [unfilled] follow-up visit [Spouse] : spouse [FreeTextEntry1] : Prema Johnson is a 74yr old right handed male here for a follow up visit. 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 He had a follow up cerebral angiogram 11/15/2019 which noted the L superior hypophyseal artery aneurysm was still filling and a new pipeline flow diverter was placed. He tolerated the procedure well and remained neurologically intact. Since he has been home he has been feeling well. He deneis any motor sensory speech visual abnormalities. 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 \par

## 2019-12-26 NOTE — PHYSICAL EXAM
[General Appearance - Alert] : alert [General Appearance - In No Acute Distress] : in no acute distress [Person] : oriented to person [Time] : oriented to time [Motor Strength] : muscle strength was normal in all four extremities [Place] : oriented to place [Involuntary Movements] : no involuntary movements were seen [] : no respiratory distress [Abnormal Walk] : normal gait

## 2019-12-26 NOTE — ASSESSMENT
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 \par \par Plan:\par The cerebral angiogram 11/15/2019 showed there was still filling of the left internal carotid artery aneurysm and it was decided at that time to place a second pipeline flow diverting stent. Right internal carotid artery aneurysm remains embolized without flow to it. \par Continue Aspirin 325mg daily \par Continue Plavix 75mg daily (was previously on plavix prior to our procedure) PRU= 7 from 5/29/2019\par MRA brain non con with [facility] in three months February 2020 then follow up in the office after. Has to get done at [facility]

## 2020-06-13 NOTE — H&P PST ADULT - PMH
Clothing CAD (coronary artery disease)    Dyslipidemia    Palpitation  treated with metoprolol  Prostate cancer  s/p XRT 2009

## 2020-07-16 ENCOUNTER — APPOINTMENT (OUTPATIENT)
Dept: NEUROSURGERY | Facility: CLINIC | Age: 75
End: 2020-07-16
Payer: MEDICARE

## 2020-07-16 DIAGNOSIS — I67.1 CEREBRAL ANEURYSM, NONRUPTURED: ICD-10-CM

## 2020-07-16 PROCEDURE — 99214 OFFICE O/P EST MOD 30 MIN: CPT | Mod: 95

## 2020-07-16 NOTE — ASSESSMENT
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 \par \par Plan:\par MRA brain non con shows patent stent \par Ok to stop aspirin from a neurosurgical perspective if he stays on the plavix \par Continue Plavix 75mg daily (was previously on plavix prior to our procedure) PRU= 7 from 5/29/2019\par Cerebral angiogram August 4, 2020. The risks, benefits, alternatives, complications and personnel associated with the procedure were discussed with the patient and the family in great detail.  They request that we proceed.\par

## 2020-07-16 NOTE — CONSULT LETTER
[Dear  ___] : Dear  [unfilled], [Consult Letter:] : I had the pleasure of evaluating your patient, [unfilled]. [DrMiguel A  ___] : Dr. WHITESIDE [Consult Closing:] : Thank you very much for allowing me to participate in the care of this patient.  If you have any questions, please do not hesitate to contact me. [DrMiguel A ___] : Dr. WHITESIDE [FreeTextEntry1] : As you know he us a 74yr old right handed male with a past medical history of prostate cancer treated with radiation in 2009, CAD with stents on Plavix and ASA. He experienced and episode of confusion and memory loss which prompted MRA of his head to be done. This MRA was significant for bilateral internal carotid artery aneurysms. He underwent diagnostic cerebral angiography in December 2018 which confirmed the presence of these two aneurysms.  We discussed treatment of these aneurysms including the risks and benefits of endovascular embolization with pipeline flow diversion and the patient wished to proceed.  On March 11, 2019 he underwent endovascular embolization of the right internal carotid artery aneurysm and then on May 28, 2019 he underwent endovascular embolization of the left internal carotid artery aneurysm with pipeline flow diversion.  On follow up angiography November 15, 2019 a second pipeline flow diversion device was placed in the left internal carotid artery.  He tolerated the procedures well and remains neurologically intact. His PRU is therapeutic at 7. If he remains on plavix he can discontinue aspirin at this time. I reviewed his MRA brain which shows aneurysms are embolized.  At this time I recommend follow up cerebral angiography. The risks, benefits, alternatives, complications and personnel associated with the procedure were discussed with the patient and the family in great detail.  They request that we proceed August 4, 2020.  [FreeTextEntry3] : \par Sincerely,\par \par Yves Martinez MD\par Professor of Neurological Surgery and Radiology\par  Department of Neurosurgery\par Director Cerebrovascular Surgery\par [facility] [employer] [facility]\par

## 2020-07-16 NOTE — REASON FOR VISIT
[Home] : at home, [unfilled] , at the time of the visit. [Medical Office: ([facility])___] : at the medical office located in  [Spouse] : spouse [Verbal consent obtained from patient] : the patient, [unfilled] [Follow-Up: _____] : a [unfilled] follow-up visit [FreeTextEntry1] : Prema Johnson is a 74yr old right handed male here for a follow up visit after having new MRA head done. 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 He had a follow up cerebral angiogram 11/15/2019 which noted the L superior hypophyseal artery aneurysm was still filling and a new pipeline flow diverter was placed. He tolerated the procedure well and remained neurologically intact. Since he has been home he has been feeling well. He denies any motor sensory speech visual abnormalities. 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 \par

## 2020-07-31 ENCOUNTER — OUTPATIENT (OUTPATIENT)
Dept: OUTPATIENT SERVICES | Facility: HOSPITAL | Age: 75
LOS: 1 days | End: 2020-07-31
Payer: MEDICARE

## 2020-07-31 VITALS
SYSTOLIC BLOOD PRESSURE: 133 MMHG | TEMPERATURE: 99 F | RESPIRATION RATE: 16 BRPM | OXYGEN SATURATION: 96 % | DIASTOLIC BLOOD PRESSURE: 72 MMHG | HEART RATE: 67 BPM | WEIGHT: 158.95 LBS | HEIGHT: 71 IN

## 2020-07-31 DIAGNOSIS — Z01.818 ENCOUNTER FOR OTHER PREPROCEDURAL EXAMINATION: ICD-10-CM

## 2020-07-31 DIAGNOSIS — I67.1 CEREBRAL ANEURYSM, NONRUPTURED: ICD-10-CM

## 2020-07-31 DIAGNOSIS — Z86.79 PERSONAL HISTORY OF OTHER DISEASES OF THE CIRCULATORY SYSTEM: ICD-10-CM

## 2020-07-31 DIAGNOSIS — Z98.890 OTHER SPECIFIED POSTPROCEDURAL STATES: Chronic | ICD-10-CM

## 2020-07-31 DIAGNOSIS — Z95.5 PRESENCE OF CORONARY ANGIOPLASTY IMPLANT AND GRAFT: Chronic | ICD-10-CM

## 2020-07-31 DIAGNOSIS — I25.10 ATHEROSCLEROTIC HEART DISEASE OF NATIVE CORONARY ARTERY WITHOUT ANGINA PECTORIS: ICD-10-CM

## 2020-07-31 DIAGNOSIS — Z90.49 ACQUIRED ABSENCE OF OTHER SPECIFIED PARTS OF DIGESTIVE TRACT: Chronic | ICD-10-CM

## 2020-07-31 LAB
ANION GAP SERPL CALC-SCNC: 15 MMOL/L — SIGNIFICANT CHANGE UP (ref 5–17)
BLD GP AB SCN SERPL QL: NEGATIVE — SIGNIFICANT CHANGE UP
BUN SERPL-MCNC: 13 MG/DL — SIGNIFICANT CHANGE UP (ref 7–23)
CALCIUM SERPL-MCNC: 9.3 MG/DL — SIGNIFICANT CHANGE UP (ref 8.4–10.5)
CHLORIDE SERPL-SCNC: 103 MMOL/L — SIGNIFICANT CHANGE UP (ref 96–108)
CO2 SERPL-SCNC: 21 MMOL/L — LOW (ref 22–31)
CREAT SERPL-MCNC: 0.77 MG/DL — SIGNIFICANT CHANGE UP (ref 0.5–1.3)
GLUCOSE SERPL-MCNC: 112 MG/DL — HIGH (ref 70–99)
HCT VFR BLD CALC: 45.7 % — SIGNIFICANT CHANGE UP (ref 39–50)
HGB BLD-MCNC: 14.9 G/DL — SIGNIFICANT CHANGE UP (ref 13–17)
MCHC RBC-ENTMCNC: 28.2 PG — SIGNIFICANT CHANGE UP (ref 27–34)
MCHC RBC-ENTMCNC: 32.6 GM/DL — SIGNIFICANT CHANGE UP (ref 32–36)
MCV RBC AUTO: 86.4 FL — SIGNIFICANT CHANGE UP (ref 80–100)
NRBC # BLD: 0 /100 WBCS — SIGNIFICANT CHANGE UP (ref 0–0)
PLATELET # BLD AUTO: 190 K/UL — SIGNIFICANT CHANGE UP (ref 150–400)
POTASSIUM SERPL-MCNC: 4.1 MMOL/L — SIGNIFICANT CHANGE UP (ref 3.5–5.3)
POTASSIUM SERPL-SCNC: 4.1 MMOL/L — SIGNIFICANT CHANGE UP (ref 3.5–5.3)
RBC # BLD: 5.29 M/UL — SIGNIFICANT CHANGE UP (ref 4.2–5.8)
RBC # FLD: 13.3 % — SIGNIFICANT CHANGE UP (ref 10.3–14.5)
RH IG SCN BLD-IMP: POSITIVE — SIGNIFICANT CHANGE UP
SODIUM SERPL-SCNC: 139 MMOL/L — SIGNIFICANT CHANGE UP (ref 135–145)
WBC # BLD: 5.42 K/UL — SIGNIFICANT CHANGE UP (ref 3.8–10.5)
WBC # FLD AUTO: 5.42 K/UL — SIGNIFICANT CHANGE UP (ref 3.8–10.5)

## 2020-07-31 PROCEDURE — 80048 BASIC METABOLIC PNL TOTAL CA: CPT

## 2020-07-31 PROCEDURE — 86901 BLOOD TYPING SEROLOGIC RH(D): CPT

## 2020-07-31 PROCEDURE — 86850 RBC ANTIBODY SCREEN: CPT

## 2020-07-31 PROCEDURE — 85027 COMPLETE CBC AUTOMATED: CPT

## 2020-07-31 PROCEDURE — 86900 BLOOD TYPING SEROLOGIC ABO: CPT

## 2020-07-31 PROCEDURE — G0463: CPT

## 2020-07-31 RX ORDER — ASPIRIN/CALCIUM CARB/MAGNESIUM 324 MG
1 TABLET ORAL
Qty: 0 | Refills: 0 | DISCHARGE

## 2020-07-31 RX ORDER — METOPROLOL TARTRATE 50 MG
0.5 TABLET ORAL
Qty: 0 | Refills: 0 | DISCHARGE

## 2020-07-31 RX ORDER — SIMVASTATIN 20 MG/1
1 TABLET, FILM COATED ORAL
Qty: 0 | Refills: 0 | DISCHARGE

## 2020-07-31 NOTE — H&P PST ADULT - HISTORY OF PRESENT ILLNESS
75 yo male, PMH CAD s/p ENRIQUE x 1 TO lad 2006, HTN, prostate Ca s/p XRT 2009, cerebral aneurysm s/p embolization with 3 pipeline stents on 3/11/19, left pipeline embolization on left ICA on 5/28/19, cerebral stent on 11/15/2019. Pt. presents to [facility] for scheduled Cerebral Angiogram for follow up. Pt. denies illness during pandemic, denies close contact with anyone that has been ill, no fever, cough, dyspnea in past two weeks.    Pt. is scheduled for COVID-19 testing on 8/1/20 at [facility]

## 2020-07-31 NOTE — H&P PST ADULT - NEGATIVE NEUROLOGICAL SYMPTOMS
no headache/no confusion/no focal seizures/no difficulty walking/no tremors/no loss of consciousness/no weakness/no generalized seizures/no syncope

## 2020-07-31 NOTE — H&P PST ADULT - NSICDXPROBLEM_GEN_ALL_CORE_FT
PROBLEM DIAGNOSES  Problem: History of cerebral aneurysm  Assessment and Plan: Cerebral angiogram follow up on 8/4/20    Problem: CAD (coronary artery disease)  Assessment and Plan: Continue aspirin, Plavix, Toprol as directed, including am of surgery with sip of water

## 2020-08-01 ENCOUNTER — OUTPATIENT (OUTPATIENT)
Dept: OUTPATIENT SERVICES | Facility: HOSPITAL | Age: 75
LOS: 1 days | End: 2020-08-01
Payer: MEDICARE

## 2020-08-01 ENCOUNTER — APPOINTMENT (OUTPATIENT)
Dept: DISASTER EMERGENCY | Facility: CLINIC | Age: 75
End: 2020-08-01

## 2020-08-01 DIAGNOSIS — Z90.49 ACQUIRED ABSENCE OF OTHER SPECIFIED PARTS OF DIGESTIVE TRACT: Chronic | ICD-10-CM

## 2020-08-01 DIAGNOSIS — Z98.890 OTHER SPECIFIED POSTPROCEDURAL STATES: Chronic | ICD-10-CM

## 2020-08-01 DIAGNOSIS — Z11.59 ENCOUNTER FOR SCREENING FOR OTHER VIRAL DISEASES: ICD-10-CM

## 2020-08-01 DIAGNOSIS — Z95.5 PRESENCE OF CORONARY ANGIOPLASTY IMPLANT AND GRAFT: Chronic | ICD-10-CM

## 2020-08-01 PROCEDURE — U0003: CPT

## 2020-08-03 LAB — SARS-COV-2 RNA SPEC QL NAA+PROBE: SIGNIFICANT CHANGE UP

## 2020-08-04 ENCOUNTER — OUTPATIENT (OUTPATIENT)
Dept: OUTPATIENT SERVICES | Facility: HOSPITAL | Age: 75
LOS: 1 days | End: 2020-08-04
Payer: MEDICARE

## 2020-08-04 ENCOUNTER — APPOINTMENT (OUTPATIENT)
Dept: NEUROSURGERY | Facility: CLINIC | Age: 75
End: 2020-08-04
Payer: MEDICARE

## 2020-08-04 ENCOUNTER — RESULT REVIEW (OUTPATIENT)
Age: 75
End: 2020-08-04

## 2020-08-04 VITALS — HEIGHT: 71 IN | WEIGHT: 158.95 LBS

## 2020-08-04 DIAGNOSIS — Z98.890 OTHER SPECIFIED POSTPROCEDURAL STATES: Chronic | ICD-10-CM

## 2020-08-04 DIAGNOSIS — Z95.5 PRESENCE OF CORONARY ANGIOPLASTY IMPLANT AND GRAFT: Chronic | ICD-10-CM

## 2020-08-04 DIAGNOSIS — I67.1 CEREBRAL ANEURYSM, NONRUPTURED: ICD-10-CM

## 2020-08-04 DIAGNOSIS — Z90.49 ACQUIRED ABSENCE OF OTHER SPECIFIED PARTS OF DIGESTIVE TRACT: Chronic | ICD-10-CM

## 2020-08-04 PROCEDURE — C1894: CPT

## 2020-08-04 PROCEDURE — 36223 PLACE CATH CAROTID/INOM ART: CPT | Mod: 59,LT

## 2020-08-04 PROCEDURE — 36224 PLACE CATH CAROTD ART: CPT | Mod: RT

## 2020-08-04 PROCEDURE — C1887: CPT

## 2020-08-04 PROCEDURE — 36223 PLACE CATH CAROTID/INOM ART: CPT | Mod: XS

## 2020-08-04 PROCEDURE — 36224 PLACE CATH CAROTD ART: CPT

## 2020-08-04 PROCEDURE — C1769: CPT

## 2020-08-04 RX ORDER — SODIUM CHLORIDE 9 MG/ML
1000 INJECTION INTRAMUSCULAR; INTRAVENOUS; SUBCUTANEOUS
Refills: 0 | Status: DISCONTINUED | OUTPATIENT
Start: 2020-08-04 | End: 2020-08-19

## 2020-08-04 NOTE — CHART NOTE - NSCHARTNOTEFT_GEN_A_CORE
Interventional Neuro Radiology  Pre-Procedure Note PA-C      HPI:  This is a 74yr old male who had CRISTINA aneurysm embolized with pipeline 3/11/2019 and LICA aneurysm embolized with pipeline most recently 11/15/2019.  HE is on dual anti platelet therapy with aspirin and plavix. He is here today for a follow up cerebral angiogram. Today he feels well. Neuro exam intact.     Allergies: Zithromax (Other)      PAST MEDICAL & SURGICAL HISTORY:  Brain aneurysm  HTN (hypertension)  HLD (hyperlipidemia)  Cerebral aneurysm: bilateral  Prostate cancer: s/p XRT 2009  CAD (coronary artery disease): denies MI  S/P coil embolization of cerebral aneurysm: right 3/2019 left ICA 5/28/19, cerebral stent 11/15/19  S/P appendectomy  S/P drug eluting coronary stent placement: LAD 2006  H/O hernia repair: x3  1980, 1983, 2002 with mesh  left inguinal      Social History: Non smoker    FAMILY HISTORY:  N/A      Current Medications:   Aspirin 325mg daily   Plavix 75mg daily     Labs:   PRU 7 11/15/2019       Assessment/Plan:   This is a 74y  year old male s/p  CRISTINA aneurysm embolized with pipeline 3/11/2019 and LICA aneurysm embolized with pipeline most recently 11/15/2019.  Patient presents to neuro-IR for selective cerebral angiography.   Procedure, goals, risks, benefits and alternatives  were discussed with patient and patient's family.  All questions were answered.  Risks include but are not limited to stroke, vessel injury, hemorrhage, and or right  groin hematoma.  Patient demonstrates understanding  of all risks involved with this procedure and wishes to continue.   Appropriate  consent was obtained from patient and consent is in the patient's chart.

## 2020-08-04 NOTE — CHART NOTE - NSCHARTNOTEFT_GEN_A_CORE
Interventional Neuro- Radiology   Procedure Note PA-C    Procedure: Selective Cerebral Angiography   Pre- Procedure Diagnosis: CRISTINA aneurysm embo with pipeline 3/11/2019 LICA aneurysm embo with pipeline 11/15/2019  Post- Procedure Diagnosis: completed occlusion of CRISTINA aneurysm; near complete occlusion of LICA aneurysm     : Dr. Yves Martinez    NP: Jossy Trammell NP    RN: Dahlia    Anesthesia: Dr. Bergeron (general anesthesia)    Sheath: 5 Hungarian long     I/Os:  Estimated blood loss: less than 10cc  IV fluids: 200cc     Urine output: Due to void     Contrast Omnipaque 240: 44cc           Vitals: /65        HR 50     Spo2 98   %    Preliminary Report:  Using a 5Fr long sheath to the right groin under MAC sedation via left common carotid artery, right internal carotid artery,  a selective cerebral angiography was performed and  demonstrates complete occlusion or CRISTINA aneurysm, near complete occlusion of LICA aneurysm. ( Official note to follow).  Patient tolerated procedure well, hemodynamically stable, no change in neurological status compared to baseline.  Results discussed with neurosurgery, patient and patient's  family.  Groin sheath was removed,  manual compression held to hemostasis  for  21 minutes, no active bleeding, no hematoma, quick clot and safeguard balloon dressing applied at h.  Patient transferred to Recovery Room

## 2020-08-13 DIAGNOSIS — I67.1 CEREBRAL ANEURYSM, NONRUPTURED: ICD-10-CM

## 2021-02-18 ENCOUNTER — NON-APPOINTMENT (OUTPATIENT)
Age: 76
End: 2021-02-18

## 2021-03-08 ENCOUNTER — RESULT REVIEW (OUTPATIENT)
Age: 76
End: 2021-03-08

## 2021-03-08 ENCOUNTER — APPOINTMENT (OUTPATIENT)
Dept: MRI IMAGING | Facility: CLINIC | Age: 76
End: 2021-03-08
Payer: MEDICARE

## 2021-03-08 ENCOUNTER — OUTPATIENT (OUTPATIENT)
Dept: OUTPATIENT SERVICES | Facility: HOSPITAL | Age: 76
LOS: 1 days | End: 2021-03-08
Payer: MEDICARE

## 2021-03-08 DIAGNOSIS — Z98.890 OTHER SPECIFIED POSTPROCEDURAL STATES: Chronic | ICD-10-CM

## 2021-03-08 DIAGNOSIS — Z95.5 PRESENCE OF CORONARY ANGIOPLASTY IMPLANT AND GRAFT: Chronic | ICD-10-CM

## 2021-03-08 DIAGNOSIS — Z90.49 ACQUIRED ABSENCE OF OTHER SPECIFIED PARTS OF DIGESTIVE TRACT: Chronic | ICD-10-CM

## 2021-03-08 DIAGNOSIS — I67.1 CEREBRAL ANEURYSM, NONRUPTURED: ICD-10-CM

## 2021-03-08 PROCEDURE — 70544 MR ANGIOGRAPHY HEAD W/O DYE: CPT

## 2021-03-08 PROCEDURE — G1004: CPT

## 2021-03-08 PROCEDURE — 70544 MR ANGIOGRAPHY HEAD W/O DYE: CPT | Mod: 26,MH

## 2021-03-16 NOTE — ED PROVIDER NOTE - OBJECTIVE STATEMENT
Cataract :Surgery Counseling: I have discussed the option of scheduling cataract surgery versus following, as well as the risks, benefits and alternatives of surgery with the patient. It was explained that the surgery is elective, there is no rush and there is no harm in waiting to have surgery. It was also explained that there is no guarantee that removing the cataract will improve their vision. The patient understands and desires to proceed with cataract surgery with implantation of an intraocular lens  to improve vision. 74 yo M p/w gen weakness x past 1 day. Pt sp dc from hosp 2 days ago sp admission for stents in neck / head. pt with no HA. no visual changes. No change in activity or behavior. No neck /back pain. No recent illness. no recent trauma. Pt with some mild chest discomfort on L side, mild assoc dyspnea. no palp. pt with episode of near syncope this am after getting out of shower. This was assoc with diaphor, clammy. no agg/allev factors. No other inj or co.

## 2021-03-19 ENCOUNTER — NON-APPOINTMENT (OUTPATIENT)
Age: 76
End: 2021-03-19

## 2021-05-15 ENCOUNTER — EMERGENCY (EMERGENCY)
Facility: HOSPITAL | Age: 76
LOS: 1 days | Discharge: HOME | End: 2021-05-15
Attending: INTERNAL MEDICINE | Admitting: INTERNAL MEDICINE
Payer: MEDICARE

## 2021-05-15 VITALS
HEART RATE: 63 BPM | RESPIRATION RATE: 18 BRPM | OXYGEN SATURATION: 97 % | DIASTOLIC BLOOD PRESSURE: 77 MMHG | SYSTOLIC BLOOD PRESSURE: 121 MMHG | TEMPERATURE: 98 F

## 2021-05-15 VITALS
SYSTOLIC BLOOD PRESSURE: 131 MMHG | HEIGHT: 71 IN | OXYGEN SATURATION: 97 % | TEMPERATURE: 98 F | WEIGHT: 158.07 LBS | HEART RATE: 135 BPM | RESPIRATION RATE: 20 BRPM | DIASTOLIC BLOOD PRESSURE: 86 MMHG

## 2021-05-15 DIAGNOSIS — Z98.890 OTHER SPECIFIED POSTPROCEDURAL STATES: Chronic | ICD-10-CM

## 2021-05-15 DIAGNOSIS — Z90.49 ACQUIRED ABSENCE OF OTHER SPECIFIED PARTS OF DIGESTIVE TRACT: Chronic | ICD-10-CM

## 2021-05-15 DIAGNOSIS — Z95.5 PRESENCE OF CORONARY ANGIOPLASTY IMPLANT AND GRAFT: Chronic | ICD-10-CM

## 2021-05-15 LAB
ALBUMIN SERPL ELPH-MCNC: 3.7 G/DL — SIGNIFICANT CHANGE UP (ref 3.3–5)
ALP SERPL-CCNC: 69 U/L — SIGNIFICANT CHANGE UP (ref 40–120)
ALT FLD-CCNC: 31 U/L — SIGNIFICANT CHANGE UP (ref 12–78)
ANION GAP SERPL CALC-SCNC: 5 MMOL/L — SIGNIFICANT CHANGE UP (ref 5–17)
AST SERPL-CCNC: 23 U/L — SIGNIFICANT CHANGE UP (ref 15–37)
BASOPHILS # BLD AUTO: 0.03 K/UL — SIGNIFICANT CHANGE UP (ref 0–0.2)
BASOPHILS NFR BLD AUTO: 0.6 % — SIGNIFICANT CHANGE UP (ref 0–2)
BILIRUB SERPL-MCNC: 0.3 MG/DL — SIGNIFICANT CHANGE UP (ref 0.2–1.2)
BUN SERPL-MCNC: 20 MG/DL — SIGNIFICANT CHANGE UP (ref 7–23)
CALCIUM SERPL-MCNC: 8.6 MG/DL — SIGNIFICANT CHANGE UP (ref 8.5–10.1)
CHLORIDE SERPL-SCNC: 113 MMOL/L — HIGH (ref 96–108)
CO2 SERPL-SCNC: 26 MMOL/L — SIGNIFICANT CHANGE UP (ref 22–31)
CREAT SERPL-MCNC: 0.66 MG/DL — SIGNIFICANT CHANGE UP (ref 0.5–1.3)
D DIMER BLD IA.RAPID-MCNC: <150 NG/ML DDU — SIGNIFICANT CHANGE UP
EOSINOPHIL # BLD AUTO: 0.21 K/UL — SIGNIFICANT CHANGE UP (ref 0–0.5)
EOSINOPHIL NFR BLD AUTO: 3.9 % — SIGNIFICANT CHANGE UP (ref 0–6)
GLUCOSE SERPL-MCNC: 105 MG/DL — HIGH (ref 70–99)
HCT VFR BLD CALC: 44.9 % — SIGNIFICANT CHANGE UP (ref 39–50)
HGB BLD-MCNC: 15.2 G/DL — SIGNIFICANT CHANGE UP (ref 13–17)
IMM GRANULOCYTES NFR BLD AUTO: 0 % — SIGNIFICANT CHANGE UP (ref 0–1.5)
LYMPHOCYTES # BLD AUTO: 2.08 K/UL — SIGNIFICANT CHANGE UP (ref 1–3.3)
LYMPHOCYTES # BLD AUTO: 38.9 % — SIGNIFICANT CHANGE UP (ref 13–44)
MCHC RBC-ENTMCNC: 28.3 PG — SIGNIFICANT CHANGE UP (ref 27–34)
MCHC RBC-ENTMCNC: 33.9 GM/DL — SIGNIFICANT CHANGE UP (ref 32–36)
MCV RBC AUTO: 83.6 FL — SIGNIFICANT CHANGE UP (ref 80–100)
MONOCYTES # BLD AUTO: 0.59 K/UL — SIGNIFICANT CHANGE UP (ref 0–0.9)
MONOCYTES NFR BLD AUTO: 11 % — SIGNIFICANT CHANGE UP (ref 2–14)
NEUTROPHILS # BLD AUTO: 2.44 K/UL — SIGNIFICANT CHANGE UP (ref 1.8–7.4)
NEUTROPHILS NFR BLD AUTO: 45.6 % — SIGNIFICANT CHANGE UP (ref 43–77)
NRBC # BLD: 0 /100 WBCS — SIGNIFICANT CHANGE UP (ref 0–0)
PLATELET # BLD AUTO: 183 K/UL — SIGNIFICANT CHANGE UP (ref 150–400)
POTASSIUM SERPL-MCNC: 3.6 MMOL/L — SIGNIFICANT CHANGE UP (ref 3.5–5.3)
POTASSIUM SERPL-SCNC: 3.6 MMOL/L — SIGNIFICANT CHANGE UP (ref 3.5–5.3)
PROT SERPL-MCNC: 7.2 G/DL — SIGNIFICANT CHANGE UP (ref 6–8.3)
RBC # BLD: 5.37 M/UL — SIGNIFICANT CHANGE UP (ref 4.2–5.8)
RBC # FLD: 13.2 % — SIGNIFICANT CHANGE UP (ref 10.3–14.5)
SODIUM SERPL-SCNC: 144 MMOL/L — SIGNIFICANT CHANGE UP (ref 135–145)
TROPONIN I SERPL-MCNC: <.015 NG/ML — SIGNIFICANT CHANGE UP (ref 0.01–0.04)
WBC # BLD: 5.35 K/UL — SIGNIFICANT CHANGE UP (ref 3.8–10.5)
WBC # FLD AUTO: 5.35 K/UL — SIGNIFICANT CHANGE UP (ref 3.8–10.5)

## 2021-05-15 PROCEDURE — 80053 COMPREHEN METABOLIC PANEL: CPT

## 2021-05-15 PROCEDURE — 99283 EMERGENCY DEPT VISIT LOW MDM: CPT | Mod: 25

## 2021-05-15 PROCEDURE — 99284 EMERGENCY DEPT VISIT MOD MDM: CPT

## 2021-05-15 PROCEDURE — 85025 COMPLETE CBC W/AUTO DIFF WBC: CPT

## 2021-05-15 PROCEDURE — 93010 ELECTROCARDIOGRAM REPORT: CPT

## 2021-05-15 PROCEDURE — 93005 ELECTROCARDIOGRAM TRACING: CPT

## 2021-05-15 PROCEDURE — 84484 ASSAY OF TROPONIN QUANT: CPT

## 2021-05-15 PROCEDURE — 36415 COLL VENOUS BLD VENIPUNCTURE: CPT

## 2021-05-15 PROCEDURE — 85379 FIBRIN DEGRADATION QUANT: CPT

## 2021-05-15 RX ORDER — ASPIRIN/CALCIUM CARB/MAGNESIUM 324 MG
325 TABLET ORAL DAILY
Refills: 0 | Status: DISCONTINUED | OUTPATIENT
Start: 2021-05-15 | End: 2021-05-18

## 2021-05-15 RX ORDER — DILTIAZEM HCL 120 MG
10 CAPSULE, EXT RELEASE 24 HR ORAL ONCE
Refills: 0 | Status: COMPLETED | OUTPATIENT
Start: 2021-05-15 | End: 2021-05-15

## 2021-05-15 RX ADMIN — Medication 325 MILLIGRAM(S): at 00:46

## 2021-05-15 NOTE — ED PROVIDER NOTE - CARE PROVIDER_API CALL
Matt Ruff)  Cardiology Medicine  [address] [place] [address], [address]  [place] [address]  Phone: [phone]  Fax: [phone]  Follow Up Time: Urgent

## 2021-05-15 NOTE — ED PROVIDER NOTE - OBJECTIVE STATEMENT
palpitations X 2 days, Denies pain/SOB. Took 25 mg of Metoprolol PTA. No history of a-fib, has one stent  palpitations palpitations X 2 days, Denies pain/SOB. Took 25 mg of Metoprolol PTA.  history of a-fib, has one stent  palpitations  Metroprolol 25 PTA 76 y/o male c/o intermittent palpitations X 2 days, Denies chest pain, SOB nor syncope . He took 25 mg of Metoprolol PTA.  history of a-fib, has one stent  palpitations, initially found to have  RVR and prior to any treatment the patient converted to NSR

## 2021-05-15 NOTE — ED ADULT NURSE NOTE - OBJECTIVE STATEMENT
76 y/o M patient presents to ED from home c/o palpitations 76 y/o M patient presents to ED from home c/o palpitations x2 days. Patient reports he took a total of 25 mg metoprolol prior to arrival. Patient A&Ox4. Patient denies HA, dizziness, SOB, chest pain, abdominal pain, N/V/D. Spouse bedside. Safety and comfort measures provided and maintained.

## 2021-05-15 NOTE — ED PROVIDER NOTE - PMH
Brain aneurysm    CAD (coronary artery disease)  denies MI  Cerebral aneurysm  bilateral  HLD (hyperlipidemia)    HTN (hypertension)    Prostate cancer  s/p XRT 2009

## 2021-05-15 NOTE — ED PROVIDER NOTE - PSH
H/O hernia repair  x3  1980, 1983, 2002 with mesh  left inguinal  S/P appendectomy    S/P coil embolization of cerebral aneurysm  right 3/2019 left ICA 5/28/19, cerebral stent 11/15/19  S/P drug eluting coronary stent placement  LAD 2006

## 2021-05-15 NOTE — ED PROVIDER NOTE - PATIENT PORTAL LINK FT
You can access the FollowMyHealth Patient Portal offered by [facility] by registering at the following website: [email or web address]. By joining [employer]’s FollowMyHealth portal, you will also be able to view your health information using other applications (apps) compatible with our system.

## 2021-05-15 NOTE — ED ADULT TRIAGE NOTE - CHIEF COMPLAINT QUOTE
palpitations X 2 days, Denies pain/SOB. Took 25 mg of Metoprolol PTA. No history of a-fib, has one stent

## 2021-07-19 NOTE — ED PROVIDER NOTE - CONTACT TIME
As the day progressed her Port a cath no longer karol blood. We attempted 4 different insertions for a chance at blood removal.  We decided that it was in the right place and needed to use Alteplase to unblock the flow.   First dwell time of insertion of al 31-May-2019 09:35

## 2022-05-17 NOTE — H&P PST ADULT - DOCUMENT STATUS
Deterioration of Condition En Route/Death or Disability/Transportation Risk (There is always a risk of traffic delays resulting in deterioration of condition.) Authored by Resident/PA/NP

## 2023-12-07 NOTE — DISCHARGE NOTE NURSING/CASE MANAGEMENT/SOCIAL WORK - NSDCPETBCESMAN_GEN_ALL_CORE
SHARAN Adams
If you are a smoker, it is important for your health to stop smoking. Please be aware that second hand smoke is also harmful.

## 2024-02-08 NOTE — PACU DISCHARGE NOTE - NSPTMEETSDISCHCRITERIADT_GEN_A_CORE
Duplicate request. Original request has been forwarded to care team.  Nichole HUYNH LPN  [facility]     16-Nov-2019 08:24

## 2024-04-22 ENCOUNTER — APPOINTMENT (OUTPATIENT)
Dept: GASTROENTEROLOGY | Facility: CLINIC | Age: 79
End: 2024-04-22
Payer: MEDICARE

## 2024-04-22 VITALS
DIASTOLIC BLOOD PRESSURE: 60 MMHG | BODY MASS INDEX: 22.96 KG/M2 | WEIGHT: 164 LBS | TEMPERATURE: 97.5 F | HEIGHT: 71 IN | SYSTOLIC BLOOD PRESSURE: 114 MMHG | HEART RATE: 64 BPM | OXYGEN SATURATION: 98 %

## 2024-04-22 DIAGNOSIS — Z86.010 PERSONAL HISTORY OF COLONIC POLYPS: ICD-10-CM

## 2024-04-22 DIAGNOSIS — Z87.891 PERSONAL HISTORY OF NICOTINE DEPENDENCE: ICD-10-CM

## 2024-04-22 DIAGNOSIS — Z85.46 PERSONAL HISTORY OF MALIGNANT NEOPLASM OF PROSTATE: ICD-10-CM

## 2024-04-22 DIAGNOSIS — Z86.79 PERSONAL HISTORY OF OTHER DISEASES OF THE CIRCULATORY SYSTEM: ICD-10-CM

## 2024-04-22 DIAGNOSIS — Z80.0 ENCOUNTER FOR SCREENING FOR MALIGNANT NEOPLASM OF COLON: ICD-10-CM

## 2024-04-22 DIAGNOSIS — Z12.11 ENCOUNTER FOR SCREENING FOR MALIGNANT NEOPLASM OF COLON: ICD-10-CM

## 2024-04-22 DIAGNOSIS — Z80.0 FAMILY HISTORY OF MALIGNANT NEOPLASM OF DIGESTIVE ORGANS: ICD-10-CM

## 2024-04-22 PROCEDURE — 99203 OFFICE O/P NEW LOW 30 MIN: CPT

## 2024-04-22 NOTE — ASSESSMENT
[FreeTextEntry1] : The patient is a 78-year-old male with a history of hyperlipidemia, coronary artery disease and a history of atrial fibrillation status post ablation.  The patient denies active coronary symptoms at the present time.  The patient has a personal history of colon polyps and a family history of colon cancer.  His age is borderline as to whether to continue colorectal cancer screening and surveillance.  We have decided to schedule a colonoscopy but did state that if this is normal, he will not require any further colorectal cancer screening.  The patient will be reaching out to his cardiologist to determine as to whether it is safe for him to stop his Plavix for 3 days.  He may continue with aspirin up until the day of the procedure.  Once the procedure is performed, I will distribute a copy of the results.

## 2024-04-22 NOTE — REVIEW OF SYSTEMS
[Fever] : no fever [Chills] : no chills [Feeling Tired] : not feeling tired [Recent Weight Loss (___ Lbs)] : no recent weight loss [Sore Throat] : no sore throat [Chest Pain] : no chest pain [Palpitations] : no palpitations [SOB on Exertion] : no shortness of breath during exertion [Constipation] : no constipation [Diarrhea] : no diarrhea [Heartburn] : no heartburn [Melena (black stool)] : no melena [Bleeding] : bleeding [Bloating (gassiness)] : no bloating

## 2024-04-22 NOTE — HISTORY OF PRESENT ILLNESS
[FreeTextEntry1] : I saw patient Prema Johnson in the office today.  The patient is a 78-year-old male who has a history of coronary artery disease status post stent.  The patient has never had an MRI and has favorable cardiological evaluations.  The patient also has a history of atrial fibrillation and did undergo successful ablation.  Prema takes aspirin and Plavix on a regular basis.  There is no history of chest pain or shortness of breath.  The patient's appetite is generally good with no dysphagia or unexplained weight loss.  Patient's bowel movements are sometimes sluggish with occasional straining.  The patient will rarely note bright red blood on the toilet tissue has been diagnosed with hemorrhoids.  The patient rarely has caffeine or ethanol and does not smoke.  The patient has been undergoing colonoscopies every 5 years due to a personal history of polyps and a family history of colon cancer.  The patient is due for repeat exam.  The patient has a loop recorder.

## 2024-08-27 ENCOUNTER — APPOINTMENT (OUTPATIENT)
Dept: GASTROENTEROLOGY | Facility: AMBULATORY MEDICAL SERVICES | Age: 79
End: 2024-08-27
Payer: MEDICARE

## 2024-08-27 PROCEDURE — 45380 COLONOSCOPY AND BIOPSY: CPT | Mod: PT

## 2025-01-31 NOTE — H&P PST ADULT - MUSCULOSKELETAL
See subsequent order encounter   detailed exam negative No joint pain, swelling or deformity; no limitation of movement